# Patient Record
Sex: FEMALE | Race: WHITE | NOT HISPANIC OR LATINO | Employment: OTHER | URBAN - METROPOLITAN AREA
[De-identification: names, ages, dates, MRNs, and addresses within clinical notes are randomized per-mention and may not be internally consistent; named-entity substitution may affect disease eponyms.]

---

## 2023-09-19 ENCOUNTER — OFFICE VISIT (OUTPATIENT)
Dept: URGENT CARE | Facility: CLINIC | Age: 89
End: 2023-09-19
Payer: MEDICARE

## 2023-09-19 VITALS
BODY MASS INDEX: 24.8 KG/M2 | RESPIRATION RATE: 16 BRPM | DIASTOLIC BLOOD PRESSURE: 72 MMHG | WEIGHT: 140 LBS | OXYGEN SATURATION: 96 % | HEART RATE: 57 BPM | TEMPERATURE: 98 F | HEIGHT: 63 IN | SYSTOLIC BLOOD PRESSURE: 152 MMHG

## 2023-09-19 DIAGNOSIS — R03.0 ELEVATED BLOOD PRESSURE READING: Primary | ICD-10-CM

## 2023-09-19 DIAGNOSIS — I10 BENIGN ESSENTIAL HTN: ICD-10-CM

## 2023-09-19 PROCEDURE — 99203 OFFICE O/P NEW LOW 30 MIN: CPT | Mod: S$GLB,,, | Performed by: STUDENT IN AN ORGANIZED HEALTH CARE EDUCATION/TRAINING PROGRAM

## 2023-09-19 PROCEDURE — 99203 PR OFFICE/OUTPT VISIT, NEW, LEVL III, 30-44 MIN: ICD-10-PCS | Mod: S$GLB,,, | Performed by: STUDENT IN AN ORGANIZED HEALTH CARE EDUCATION/TRAINING PROGRAM

## 2023-09-19 RX ORDER — ATENOLOL 100 MG/1
TABLET ORAL
COMMUNITY
Start: 2023-08-09

## 2023-09-19 RX ORDER — MUPIROCIN 20 MG/G
OINTMENT TOPICAL
COMMUNITY
Start: 2023-08-07

## 2023-09-19 RX ORDER — AZELASTINE 1 MG/ML
SPRAY, METERED NASAL
COMMUNITY
Start: 2023-08-18

## 2023-09-19 NOTE — PROGRESS NOTES
"Subjective:      Patient ID: Ara Moreno is a 89 y.o. female.    Vitals:  height is 5' 2.5" (1.588 m) and weight is 63.5 kg (140 lb). Her temperature is 97.5 °F (36.4 °C). Her blood pressure is 152/72 (abnormal) and her pulse is 57 (abnormal). Her respiration is 16 and oxygen saturation is 96%.     Chief Complaint: Hypertension    Patient is here for blood pressure issues. She monitors it at home. Yesterday, her systolic numbers were higher than normal. Today, both systolic and diastolic numbers were high. She called her PCP but wasn't able to get an appointment until tomorrow. She states that she has been taking the same dosage of atenolol but the drug manufactor changed recently.   Drinks 2 cups of coffee in the morning  Misses her BP medication once a week  She has been very stressed out due to getting her house renovated  She has been scheduled to see her PCP's NP tomorrow for blood pressure f/u  BP reading from yesterday were 150/70s  Today her BP reading was the highest          Hypertension  This is a new problem. The current episode started yesterday. The problem has been gradually worsening since onset. Associated symptoms include sweats. Pertinent negatives include no blurred vision, chest pain, headaches or shortness of breath. Risk factors for coronary artery disease include family history. There is no history of angina or kidney disease.       Constitution: Negative for chills and fever.   Cardiovascular:  Negative for chest pain.   Eyes:  Negative for blurred vision.   Respiratory:  Negative for shortness of breath.    Neurological:  Negative for headaches.      Objective:     Physical Exam   HENT:   Head: Normocephalic and atraumatic.   Eyes: Conjunctivae are normal. Pupils are equal, round, and reactive to light.   Cardiovascular: Normal rate, regular rhythm and normal heart sounds.   Pulmonary/Chest: Effort normal and breath sounds normal.   Abdominal: Normal appearance.   Neurological: She is " alert.   Psychiatric: Her behavior is normal. Mood normal.       Assessment:     1. Elevated blood pressure reading    2. Benign essential HTN        Plan:       Elevated blood pressure reading    Benign essential HTN    -repeat BP was better at 152/72  -pt has an appointment with PCP tomorrow to change BP medication  -counseled patient on checking her BP only in the morning and in the evening  -patient given ER and return precautions      Medical Decision Making:   Differential Diagnosis:   Elevated BP 2/2 to anxiety vs pt may need BP medication changed  Urgent Care Management:  This patient is 89-year-old female with a past medical history of hypertension who comes in with elevated blood pressure.  Patient states that her blood pressure has been elevated and got up to 180 systolically.  She is concerned about her blood pressure because she states that it is never this high.  Patient denied any chest pain shortness of breath nausea vomiting headache vision changes.  Vital signs were within normal limits except for BP of 176/72.  Physical exam was unremarkable.  The patient's blood pressure was rechecked at the end of the visit and her BP was 152/72.  I discussed with the patient that she is checking her blood pressure too often also expressed to her that anxiety can cause her blood pressure to be elevated as well.  I explained to her that she may need to consider a blood pressure medication change as atenolol has poor blood pressure control.  She made an appointment with her PCP while I was in the room.  I gave the patient written instructions on some BP medication suggestions that she may want to consider after discussing this with her PCP in office tomorrow.  She agree that her blood pressure medication needs to be changed.  She also stated that she would stop checking her blood pressures often.  ED and return precautions were given to the patient.  She voiced understanding.   Please attend your appointment  tomorrow with your primary care doctor at 11:00 a.m..  You need to discuss with your doctor changing your blood pressure medication to something else as your blood pressure medication may not be working anymore.  You can consider changing the medication to amlodipine or a diuretic such as hydrochlorothiazide or chlorthalidone alone.  If the medication needs to remain a beta-blocker then I would consider changing the medication to carvedilol.  Please take your blood pressure in the morning and at nighttime.  Try not to measure your blood pressure more than twice a day as this may cause falsely elevated readings.  If your blood pressure is consistently above 180/120 and you have any of the following such as chest pain, shortness of breath, headaches, nausea or vomiting, vision changes then please go to the ER.  If you have any questions please give us a call at the urgent care.     Please follow up with your primary care provider within 2-5 days if your signs and symptoms have not resolved or worsen.     If your condition worsens or fails to improve we recommend that you receive another evaluation at the emergency room immediately or contact your primary medical clinic to discuss your concerns.   You must understand that you have received an Urgent Care treatment only and that you may be released before all of your medical problems are known or treated. You, the patient, will arrange for follow up care as instructed.

## 2023-09-19 NOTE — PATIENT INSTRUCTIONS
Please attend your appointment tomorrow with your primary care doctor at 11:00 a.m..  You need to discuss with your doctor changing your blood pressure medication to something else as your blood pressure medication may not be working anymore.  You can consider changing the medication to amlodipine or a diuretic such as hydrochlorothiazide or chlorthalidone alone.  If the medication needs to remain a beta-blocker then I would consider changing the medication to carvedilol.  Please take your blood pressure in the morning and at nighttime.  Try not to measure your blood pressure more than twice a day as this may cause falsely elevated readings.  If your blood pressure is consistently above 180/120 and you have any of the following such as chest pain, shortness of breath, headaches, nausea or vomiting, vision changes then please go to the ER.  If you have any questions please give us a call at the urgent care.     Please follow up with your primary care provider within 2-5 days if your signs and symptoms have not resolved or worsen.     If your condition worsens or fails to improve we recommend that you receive another evaluation at the emergency room immediately or contact your primary medical clinic to discuss your concerns.   You must understand that you have received an Urgent Care treatment only and that you may be released before all of your medical problems are known or treated. You, the patient, will arrange for follow up care as instructed.

## 2025-03-13 ENCOUNTER — HOSPITAL ENCOUNTER (OUTPATIENT)
Dept: TELEMEDICINE | Facility: HOSPITAL | Age: 89
Discharge: HOME | End: 2025-03-13
Payer: MEDICARE

## 2025-03-13 DIAGNOSIS — I63.511 STROKE DUE TO OCCLUSION OF RIGHT MIDDLE CEREBRAL ARTERY: Primary | ICD-10-CM

## 2025-03-13 NOTE — SUBJECTIVE & OBJECTIVE
HPI:  89 y.o. female with a PMHx significant for HTN, HLD presenting with left-sided weakness and numbness and dysarthria. LKN around 1220. No AP/AC medications.     /92      Images personally reviewed and interpreted:  Study: Head CT and CTA Head & Neck  Study Interpretation: No acute intracranial hemorrhage. CTA head/neck with a right M2 occlusion.      Assessment and plan:  # Stroke due to occlusion of right middle cerebral artery  - Symptoms have rapidly improved since onset with residual left facial droop and dysarthria. Mild weakness on confrontation examination. As such, plan to defer IV thrombolysis at this time given her current examination. Also not an EVT candidate currently; however, will plan to transfer to a comprehensive stroke center for close monitoring, consideration of intervention if her exam were to worsen.     Lytics recommendation: Thrombolytic therapy not recommended due to Mild Non-Disabling Symptoms  Thrombectomy recommendation: No; no significant neurologic deficit (NIHSS <6)   Placement recommendation: pending further studies    Antithrombotics for secondary stroke prevention: Load aspirin 325mg, clopidogrel 300mg x 1 now. Then start dual-antiplatelet therapy with ASA 81mg and clopidogrel 75mg daily for 21 days. Then continue ASA 81mg indefinitely.     Statins for secondary stroke prevention and hyperlipidemia, if present: Recommend initiation or continuation of a high-intensity statin for goal LDL < 70mg/dL.    Aggressive risk factor modification: HTN     Rehab efforts: The patient has been evaluated by a stroke team provider and the therapy needs have been fully considered based off the presenting complaints and exam findings. The following therapy evaluations are needed: PT evaluate and treat, OT evaluate and treat, SLP evaluate and treat    Diagnostics recommended:   - MRI brain without contrast   - TTE without bubble study, monitor on telemetry while admitted  - Labs:  hemoglobin A1c (goal <7%), lipid panel (LDL goal <70mg/dL), TSH  - Treat hyperglycemia to achieve a blood glucose level in a range of 140-180 mg/dL and to closely monitor to prevent hypoglycemia (Class IIa, Level C-LD).    VTE prophylaxis: Enoxaparin 40 mg SQ every 24 hours  Mechanical prophylaxis: Place SCDs    BP parameters: Infarct: No intervention, SBP <220    - Patient may be at risk for worsening deficits with positional changes or drops in blood pressure  (pressure-dependent state), or if there is extension of stroke or development of cerebral edema;  as such, recommend close neurological monitoring.  - Recommend permissive hypertension for the initial 24 to 48 hours of acute ischemic stroke unless the blood pressure is >220 mm Hg systolic or >120 mm Hg diastolic, or there is a concomitant specific medical condition that would benefit from blood pressure lowering (e.g., MI, aortic dissection).  - In the setting of possible hypovolemia, initiate IV fluids to maintain adequate hydration and euvolemia (although be cautious in patients at risk of fluid overload, such as those with renal or heart failure).  - The optimal time after the onset of acute ischemic stroke to restart or start long-term antihypertensive therapy has not been established and may depend on various patient and stroke characteristics (e.g., pressure dependent state), but in most patients it is reasonable to initiate long-term antihypertensive therapy after the initial 48 to 72 hours from stroke onset.    Please contact us with any further questions or concerns or if patient has any acute neurological changes (new symptoms, worsening deficits).

## 2025-03-13 NOTE — TELEMEDICINE CONSULT
[facility]  Vascular Neurology  Comprehensive Stroke Center  TeleVascular Neurology Acute Consultation Note        Consult Information  Consults    Consulting Provider: CHANNING CHRISTENSEN   Current Providers  No providers found    Patient Location: [facility] Emergency Department    Spoke hospital nurse at bedside with patient assisting consultant.  Patient information was obtained from patient.       Stroke Documentation  Acute Stroke Times   Last Known Normal Date: 03/13/25  Last Known Normal Time: 1220  Symptom Onset Date: 03/13/25  Symptom Onset Time: 1220  Stroke Team Called Date: 03/13/25  Stroke Team Called Time: 1344  Stroke Team Arrival Date: 03/13/25  Stroke Team Arrival Time: 1344  CT Interpretation Time: 1358  Thrombolytic Therapy Recommended: No    NIH Scale:  Interval: baseline  1a. Level of Consciousness: 0-->Alert, keenly responsive  1b. LOC Questions: 0-->Answers both questions correctly  1c. LOC Commands: 0-->Performs both tasks correctly  2. Best Gaze: 0-->Normal  3. Visual: 0-->No visual loss  4. Facial Palsy: 1-->Minor paralysis (flattened nasolabial fold, asymmetry on smiling)  5a. Motor Arm, Left: 0-->No drift, limb holds 90 (or 45) degrees for full 10 secs  5b. Motor Arm, Right: 0-->No drift, limb holds 90 (or 45) degrees for full 10 secs  6a. Motor Leg, Left: 0-->No drift, leg holds 30 degree position for full 5 secs  6b. Motor Leg, Right: 0-->No drift, leg holds 30 degree position for full 5 secs  7. Limb Ataxia: 0-->Absent  8. Sensory: 0-->Normal, no sensory loss  9. Best Language: 0-->No aphasia, normal  10. Dysarthria: 1-->Mild-to-moderate dysarthria, patient slurs at least some words and, at worst, can be understood with some difficulty  11. Extinction and Inattention (formerly Neglect): 0-->No abnormality  Total (NIH Stroke Scale): 2      Modified Ciales: Score: 0  Elinor Coma Scale:     ABCD2 Score:     [id number] Score:    HAS -BLED Score:    ICH Score:    Hunt & Reyes Classification:      There were no vitals taken for this visit.      In my opinion, this was a: Tier 1; VAN Stroke Assessment: Negative     Medical Decision Making  HPI:  89 y.o. female with a PMHx significant for HTN, HLD presenting with left-sided weakness and numbness and dysarthria. LKN around 1220. No AP/AC medications.     /92      Images personally reviewed and interpreted:  Study: Head CT and CTA Head & Neck  Study Interpretation: No acute intracranial hemorrhage. CTA head/neck with a right M2 occlusion.      Assessment and plan:  # Stroke due to occlusion of right middle cerebral artery  - Symptoms have rapidly improved since onset with residual left facial droop and dysarthria. Mild weakness on confrontation examination. As such, plan to defer IV thrombolysis at this time given her current examination. Also not an EVT candidate currently; however, will plan to transfer to a comprehensive stroke center for close monitoring, consideration of intervention if her exam were to worsen.     Lytics recommendation: Thrombolytic therapy not recommended due to Mild Non-Disabling Symptoms  Thrombectomy recommendation: No; no significant neurologic deficit (NIHSS <6)   Placement recommendation: pending further studies    Antithrombotics for secondary stroke prevention: Load aspirin 325mg, clopidogrel 300mg x 1 now. Then start dual-antiplatelet therapy with ASA 81mg and clopidogrel 75mg daily for 21 days. Then continue ASA 81mg indefinitely.     Statins for secondary stroke prevention and hyperlipidemia, if present: Recommend initiation or continuation of a high-intensity statin for goal LDL < 70mg/dL.    Aggressive risk factor modification: HTN     Rehab efforts: The patient has been evaluated by a stroke team provider and the therapy needs have been fully considered based off the presenting complaints and exam findings. The following  therapy evaluations are needed: PT evaluate and treat, OT evaluate and treat, SLP evaluate and treat    Diagnostics recommended:   - MRI brain without contrast   - TTE without bubble study, monitor on telemetry while admitted  - Labs: hemoglobin A1c (goal <7%), lipid panel (LDL goal <70mg/dL), TSH  - Treat hyperglycemia to achieve a blood glucose level in a range of 140-180 mg/dL and to closely monitor to prevent hypoglycemia (Class IIa, Level C-LD).    VTE prophylaxis: Enoxaparin 40 mg SQ every 24 hours  Mechanical prophylaxis: Place SCDs    BP parameters: Infarct: No intervention, SBP <220    - Patient may be at risk for worsening deficits with positional changes or drops in blood pressure  (pressure-dependent state), or if there is extension of stroke or development of cerebral edema;  as such, recommend close neurological monitoring.  - Recommend permissive hypertension for the initial 24 to 48 hours of acute ischemic stroke unless the blood pressure is >220 mm Hg systolic or >120 mm Hg diastolic, or there is a concomitant specific medical condition that would benefit from blood pressure lowering (e.g., MI, aortic dissection).  - In the setting of possible hypovolemia, initiate IV fluids to maintain adequate hydration and euvolemia (although be cautious in patients at risk of fluid overload, such as those with renal or heart failure).  - The optimal time after the onset of acute ischemic stroke to restart or start long-term antihypertensive therapy has not been established and may depend on various patient and stroke characteristics (e.g., pressure dependent state), but in most patients it is reasonable to initiate long-term antihypertensive therapy after the initial 48 to 72 hours from stroke onset.    Please contact us with any further questions or concerns or if patient has any acute neurological changes (new symptoms, worsening deficits).              ROS  Physical Exam  No past medical history on file.  No  past surgical history on file.  No family history on file.    Diagnoses  Problem Noted   Stroke Due to Occlusion of Right Middle Cerebral Artery 3/13/2025       Tiffany Ortiz MD      Emergent/Acute neurological consultation requested by spoke provider due to critical concerns for possible cerebrovascular event that could result in permanent loss of neurologic/bodily function, severe disability or death of this patient.  Immediate/timely evaluation by a highly prepared expert is paramount for optimal outcomes  High risk for neurological deterioration if not properly diagnosed  High risk for neurological deterioration if not treated promplty/as soon as possible  Complex diagnostic evaluation may be required (advanced imaging)  High risk treatment options (thrombolytics and/or thrombectomy)    Patient care was coordinated with spoke provider, including but not limted to    Discussing likely diagnosis/etiology of symptoms  Making recommendations for further diagnostic studies  Making recommendations for intravenous thrombolytics or other advanced therapies  Making recommendations for disposition (admission/transfer for higher level of care)      Neurology consultation requested by spoke provider. Audiovisual encounter with the patient performed using a secure connection.  Results and impressions from the visit are documented on this note and were communicated to the consulting provider/team via direct communication. The note has been shared for addition to the patients electronic medical record.

## 2025-03-14 ENCOUNTER — HOSPITAL ENCOUNTER (INPATIENT)
Facility: HOSPITAL | Age: 89
LOS: 4 days | Discharge: NURSING, REHAB OR LONG-TERM CARE | DRG: 065 | End: 2025-03-18
Attending: INTERNAL MEDICINE | Admitting: INTERNAL MEDICINE
Payer: MEDICARE

## 2025-03-14 DIAGNOSIS — I63.9 CVA (CEREBRAL VASCULAR ACCIDENT): ICD-10-CM

## 2025-03-14 DIAGNOSIS — I63.9 DYSARTHRIA DUE TO ACUTE CEREBELLAR CEREBROVASCULAR ACCIDENT (CVA): ICD-10-CM

## 2025-03-14 DIAGNOSIS — I63.511 STROKE DUE TO OCCLUSION OF RIGHT MIDDLE CEREBRAL ARTERY: Primary | ICD-10-CM

## 2025-03-14 DIAGNOSIS — R47.1 DYSARTHRIA DUE TO ACUTE CEREBELLAR CEREBROVASCULAR ACCIDENT (CVA): ICD-10-CM

## 2025-03-14 LAB
ALBUMIN SERPL-MCNC: 3.8 G/DL (ref 3.4–4.8)
ALBUMIN/GLOB SERPL: 1.1 RATIO (ref 1.1–2)
ALP SERPL-CCNC: 112 UNIT/L (ref 40–150)
ALT SERPL-CCNC: 9 UNIT/L (ref 0–55)
ANION GAP SERPL CALC-SCNC: 11 MEQ/L
APICAL FOUR CHAMBER EJECTION FRACTION: 69 %
APICAL TWO CHAMBER EJECTION FRACTION: 66 %
APTT PPP: 26.2 SECONDS (ref 23.2–33.7)
AST SERPL-CCNC: 28 UNIT/L (ref 5–34)
AV INDEX (PROSTH): 1.04
AV MEAN GRADIENT: 3 MMHG
AV PEAK GRADIENT: 5 MMHG
AV VALVE AREA BY VELOCITY RATIO: 3.1 CM²
AV VALVE AREA: 3.3 CM²
AV VELOCITY RATIO: 1
BASOPHILS # BLD AUTO: 0.04 X10(3)/MCL
BASOPHILS NFR BLD AUTO: 0.4 %
BILIRUB SERPL-MCNC: 1.3 MG/DL
BSA FOR ECHO PROCEDURE: 1.63 M2
BUN SERPL-MCNC: 13.3 MG/DL (ref 9.8–20.1)
CALCIUM SERPL-MCNC: 9.2 MG/DL (ref 8.4–10.2)
CHLORIDE SERPL-SCNC: 105 MMOL/L (ref 98–111)
CHOLEST SERPL-MCNC: 187 MG/DL
CHOLEST/HDLC SERPL: 5 {RATIO} (ref 0–5)
CO2 SERPL-SCNC: 23 MMOL/L (ref 23–31)
CREAT SERPL-MCNC: 0.75 MG/DL (ref 0.55–1.02)
CREAT/UREA NIT SERPL: 18
CV ECHO LV RWT: 0.44 CM
DOP CALC AO PEAK VEL: 1.1 M/S
DOP CALC AO VTI: 27.3 CM
DOP CALC LVOT AREA: 3.1 CM2
DOP CALC LVOT DIAMETER: 2 CM
DOP CALC LVOT PEAK VEL: 1.1 M/S
DOP CALC LVOT STROKE VOLUME: 89.2 CM3
DOP CALC MV VTI: 34.9 CM
DOP CALCLVOT PEAK VEL VTI: 28.4 CM
E WAVE DECELERATION TIME: 254 MSEC
E/A RATIO: 0.74
E/E' RATIO: 9 M/S
ECHO LV POSTERIOR WALL: 1 CM (ref 0.6–1.1)
EOSINOPHIL # BLD AUTO: 0.09 X10(3)/MCL (ref 0–0.9)
EOSINOPHIL NFR BLD AUTO: 0.8 %
ERYTHROCYTE [DISTWIDTH] IN BLOOD BY AUTOMATED COUNT: 13.5 % (ref 11.5–17)
EST. AVERAGE GLUCOSE BLD GHB EST-MCNC: 111.2 MG/DL
FRACTIONAL SHORTENING: 44.4 % (ref 28–44)
GFR SERPLBLD CREATININE-BSD FMLA CKD-EPI: >60 ML/MIN/1.73/M2
GLOBULIN SER-MCNC: 3.4 GM/DL (ref 2.4–3.5)
GLUCOSE SERPL-MCNC: 97 MG/DL (ref 75–121)
HBA1C MFR BLD: 5.5 %
HCT VFR BLD AUTO: 42.9 % (ref 37–47)
HDLC SERPL-MCNC: 40 MG/DL (ref 35–60)
HGB BLD-MCNC: 14.9 G/DL (ref 12–16)
HR MV ECHO: 61 BPM
IMM GRANULOCYTES # BLD AUTO: 0.04 X10(3)/MCL (ref 0–0.04)
IMM GRANULOCYTES NFR BLD AUTO: 0.4 %
INR PPP: 1.1
INTERVENTRICULAR SEPTUM: 1.4 CM (ref 0.6–1.1)
LDLC SERPL CALC-MCNC: 116 MG/DL (ref 50–140)
LEFT ATRIUM AREA SYSTOLIC (APICAL 2 CHAMBER): 15.6 CM2
LEFT ATRIUM AREA SYSTOLIC (APICAL 4 CHAMBER): 15.6 CM2
LEFT ATRIUM SIZE: 3.8 CM
LEFT ATRIUM VOLUME INDEX MOD: 24 ML/M2
LEFT ATRIUM VOLUME MOD: 39 ML
LEFT INTERNAL DIMENSION IN SYSTOLE: 2.5 CM (ref 2.1–4)
LEFT VENTRICLE DIASTOLIC VOLUME INDEX: 57.14 ML/M2
LEFT VENTRICLE DIASTOLIC VOLUME: 92 ML
LEFT VENTRICLE END DIASTOLIC VOLUME APICAL 2 CHAMBER: 55.4 ML
LEFT VENTRICLE END DIASTOLIC VOLUME APICAL 4 CHAMBER: 63.3 ML
LEFT VENTRICLE END SYSTOLIC VOLUME APICAL 2 CHAMBER: 40.3 ML
LEFT VENTRICLE END SYSTOLIC VOLUME APICAL 4 CHAMBER: 37.9 ML
LEFT VENTRICLE MASS INDEX: 123 G/M2
LEFT VENTRICLE SYSTOLIC VOLUME INDEX: 13.7 ML/M2
LEFT VENTRICLE SYSTOLIC VOLUME: 22 ML
LEFT VENTRICULAR INTERNAL DIMENSION IN DIASTOLE: 4.5 CM (ref 3.5–6)
LEFT VENTRICULAR MASS: 198.1 G
LV LATERAL E/E' RATIO: 8.6 M/S
LV SEPTAL E/E' RATIO: 9.9 M/S
LVED V (TEICH): 91.5 ML
LVES V (TEICH): 21.7 ML
LVOT MG: 3 MMHG
LVOT MV: 0.72 CM/S
LYMPHOCYTES # BLD AUTO: 4.06 X10(3)/MCL (ref 0.6–4.6)
LYMPHOCYTES NFR BLD AUTO: 38.3 %
MAGNESIUM SERPL-MCNC: 1.9 MG/DL (ref 1.6–2.6)
MCH RBC QN AUTO: 28.2 PG (ref 27–31)
MCHC RBC AUTO-ENTMCNC: 34.7 G/DL (ref 33–36)
MCV RBC AUTO: 81.3 FL (ref 80–94)
MONOCYTES # BLD AUTO: 0.64 X10(3)/MCL (ref 0.1–1.3)
MONOCYTES NFR BLD AUTO: 6 %
MV MEAN GRADIENT: 2 MMHG
MV PEAK A VEL: 0.93 M/S
MV PEAK E VEL: 0.69 M/S
MV PEAK GRADIENT: 5 MMHG
MV STENOSIS PRESSURE HALF TIME: 71 MS
MV VALVE AREA BY CONTINUITY EQUATION: 2.56 CM2
MV VALVE AREA P 1/2 METHOD: 3.1 CM2
NEUTROPHILS # BLD AUTO: 5.74 X10(3)/MCL (ref 2.1–9.2)
NEUTROPHILS NFR BLD AUTO: 54.1 %
NRBC BLD AUTO-RTO: 0 %
OHS LV EJECTION FRACTION SIMPSONS BIPLANE MOD: 67 %
OHS QRS DURATION: 84 MS
OHS QTC CALCULATION: 422 MS
PISA TR MAX VEL: 2.4 M/S
PLATELET # BLD AUTO: 130 X10(3)/MCL (ref 130–400)
PMV BLD AUTO: 9.5 FL (ref 7.4–10.4)
POTASSIUM SERPL-SCNC: 3.7 MMOL/L (ref 3.5–5.1)
PROT SERPL-MCNC: 7.2 GM/DL (ref 5.8–7.6)
PROTHROMBIN TIME: 13.8 SECONDS (ref 12.5–14.5)
RA PRESSURE ESTIMATED: 3 MMHG
RBC # BLD AUTO: 5.28 X10(6)/MCL (ref 4.2–5.4)
RV TB RVSP: 5 MMHG
SINUS: 3.2 CM
SODIUM SERPL-SCNC: 139 MMOL/L (ref 136–145)
TDI LATERAL: 0.08 M/S
TDI SEPTAL: 0.07 M/S
TDI: 0.08 M/S
TR MAX PG: 17 MMHG
TRICUSPID ANNULAR PLANE SYSTOLIC EXCURSION: 2.11 CM
TRIGL SERPL-MCNC: 153 MG/DL (ref 37–140)
TROPONIN I SERPL-MCNC: 0.01 NG/ML (ref 0–0.04)
TSH SERPL-ACNC: 1.53 UIU/ML (ref 0.35–4.94)
TV REST PULMONARY ARTERY PRESSURE: 26 MMHG
VLDLC SERPL CALC-MCNC: 31 MG/DL
WBC # BLD AUTO: 10.61 X10(3)/MCL (ref 4.5–11.5)
Z-SCORE OF LEFT VENTRICULAR DIMENSION IN END DIASTOLE: -0.15
Z-SCORE OF LEFT VENTRICULAR DIMENSION IN END SYSTOLE: -0.99

## 2025-03-14 PROCEDURE — 25500020 PHARM REV CODE 255: Performed by: INTERNAL MEDICINE

## 2025-03-14 PROCEDURE — 84484 ASSAY OF TROPONIN QUANT: CPT

## 2025-03-14 PROCEDURE — 97166 OT EVAL MOD COMPLEX 45 MIN: CPT

## 2025-03-14 PROCEDURE — 36415 COLL VENOUS BLD VENIPUNCTURE: CPT

## 2025-03-14 PROCEDURE — 25000003 PHARM REV CODE 250: Performed by: NURSE PRACTITIONER

## 2025-03-14 PROCEDURE — 83036 HEMOGLOBIN GLYCOSYLATED A1C: CPT

## 2025-03-14 PROCEDURE — 80053 COMPREHEN METABOLIC PANEL: CPT

## 2025-03-14 PROCEDURE — 25000003 PHARM REV CODE 250: Performed by: INTERNAL MEDICINE

## 2025-03-14 PROCEDURE — 84443 ASSAY THYROID STIM HORMONE: CPT

## 2025-03-14 PROCEDURE — 80061 LIPID PANEL: CPT

## 2025-03-14 PROCEDURE — 83735 ASSAY OF MAGNESIUM: CPT

## 2025-03-14 PROCEDURE — 85025 COMPLETE CBC W/AUTO DIFF WBC: CPT

## 2025-03-14 PROCEDURE — 93005 ELECTROCARDIOGRAM TRACING: CPT

## 2025-03-14 PROCEDURE — 25000003 PHARM REV CODE 250

## 2025-03-14 PROCEDURE — 97162 PT EVAL MOD COMPLEX 30 MIN: CPT

## 2025-03-14 PROCEDURE — 85730 THROMBOPLASTIN TIME PARTIAL: CPT

## 2025-03-14 PROCEDURE — 85610 PROTHROMBIN TIME: CPT

## 2025-03-14 PROCEDURE — 93010 ELECTROCARDIOGRAM REPORT: CPT | Mod: ,,, | Performed by: INTERNAL MEDICINE

## 2025-03-14 PROCEDURE — 21400001 HC TELEMETRY ROOM

## 2025-03-14 RX ORDER — ASPIRIN 81 MG/1
81 TABLET ORAL DAILY
Status: DISCONTINUED | OUTPATIENT
Start: 2025-03-14 | End: 2025-03-18 | Stop reason: HOSPADM

## 2025-03-14 RX ORDER — ATENOLOL 100 MG/1
100 TABLET ORAL DAILY
Status: ON HOLD | COMMUNITY
End: 2025-03-18 | Stop reason: HOSPADM

## 2025-03-14 RX ORDER — LABETALOL HYDROCHLORIDE 5 MG/ML
10 INJECTION, SOLUTION INTRAVENOUS EVERY 4 HOURS PRN
Status: DISCONTINUED | OUTPATIENT
Start: 2025-03-14 | End: 2025-03-17

## 2025-03-14 RX ORDER — SODIUM CHLORIDE 9 MG/ML
INJECTION, SOLUTION INTRAVENOUS CONTINUOUS
Status: DISCONTINUED | OUTPATIENT
Start: 2025-03-14 | End: 2025-03-17

## 2025-03-14 RX ORDER — ACETAMINOPHEN 325 MG/1
650 TABLET ORAL EVERY 6 HOURS PRN
Status: DISCONTINUED | OUTPATIENT
Start: 2025-03-14 | End: 2025-03-18 | Stop reason: HOSPADM

## 2025-03-14 RX ORDER — CALCIUM CARBONATE 200(500)MG
500 TABLET,CHEWABLE ORAL 3 TIMES DAILY PRN
Status: DISCONTINUED | OUTPATIENT
Start: 2025-03-14 | End: 2025-03-18 | Stop reason: HOSPADM

## 2025-03-14 RX ORDER — BISACODYL 10 MG/1
10 SUPPOSITORY RECTAL DAILY PRN
Status: DISCONTINUED | OUTPATIENT
Start: 2025-03-14 | End: 2025-03-18 | Stop reason: HOSPADM

## 2025-03-14 RX ORDER — ATORVASTATIN CALCIUM 40 MG/1
40 TABLET, FILM COATED ORAL DAILY
Status: DISCONTINUED | OUTPATIENT
Start: 2025-03-14 | End: 2025-03-18 | Stop reason: HOSPADM

## 2025-03-14 RX ORDER — SODIUM CHLORIDE 0.9 % (FLUSH) 0.9 %
10 SYRINGE (ML) INJECTION
Status: DISCONTINUED | OUTPATIENT
Start: 2025-03-14 | End: 2025-03-18 | Stop reason: HOSPADM

## 2025-03-14 RX ORDER — MUPIROCIN 20 MG/G
OINTMENT TOPICAL 2 TIMES DAILY
Status: DISCONTINUED | OUTPATIENT
Start: 2025-03-14 | End: 2025-03-18 | Stop reason: HOSPADM

## 2025-03-14 RX ORDER — HYDRALAZINE HYDROCHLORIDE 20 MG/ML
10 INJECTION INTRAMUSCULAR; INTRAVENOUS EVERY 6 HOURS PRN
Status: DISCONTINUED | OUTPATIENT
Start: 2025-03-14 | End: 2025-03-17

## 2025-03-14 RX ADMIN — ATORVASTATIN CALCIUM 40 MG: 40 TABLET, FILM COATED ORAL at 10:03

## 2025-03-14 RX ADMIN — ACETAMINOPHEN 650 MG: 325 TABLET, FILM COATED ORAL at 10:03

## 2025-03-14 RX ADMIN — SODIUM CHLORIDE: 9 INJECTION, SOLUTION INTRAVENOUS at 12:03

## 2025-03-14 RX ADMIN — SODIUM CHLORIDE: 9 INJECTION, SOLUTION INTRAVENOUS at 11:03

## 2025-03-14 RX ADMIN — ASPIRIN 81 MG: 81 TABLET, COATED ORAL at 10:03

## 2025-03-14 RX ADMIN — MUPIROCIN: 20 OINTMENT TOPICAL at 10:03

## 2025-03-14 RX ADMIN — IOHEXOL 100 ML: 350 INJECTION, SOLUTION INTRAVENOUS at 12:03

## 2025-03-14 NOTE — PLAN OF CARE
Problem: Adult Inpatient Plan of Care  Goal: Plan of Care Review  Flowsheets (Taken 3/14/2025 1809)  Plan of Care Reviewed With:   patient   family  Goal: Absence of Hospital-Acquired Illness or Injury  Intervention: Identify and Manage Fall Risk  Flowsheets (Taken 3/14/2025 1809)  Safety Promotion/Fall Prevention:   Fall Risk reviewed with patient/family   family to remain at bedside   medications reviewed   lighting adjusted   instructed to call staff for mobility   side rails raised x 2  Intervention: Prevent Skin Injury  Flowsheets (Taken 3/14/2025 1809)  Body Position:   turned   weight shifting  Skin Protection: incontinence pads utilized  Device Skin Pressure Protection:   absorbent pad utilized/changed   positioning supports utilized   tubing/devices free from skin contact  Intervention: Prevent and Manage VTE (Venous Thromboembolism) Risk  Flowsheets (Taken 3/14/2025 1809)  VTE Prevention/Management:   remove, assess skin, and reapply sequential compression device   ROM (active) performed   ROM (passive) performed   dorsiflexion/plantar flexion performed   intravenous hydration  Intervention: Prevent Infection  Flowsheets (Taken 3/14/2025 1809)  Infection Prevention: hand hygiene promoted  Goal: Optimal Comfort and Wellbeing  Intervention: Monitor Pain and Promote Comfort  Flowsheets (Taken 3/14/2025 1809)  Pain Management Interventions:   care clustered   medication offered   position adjusted

## 2025-03-14 NOTE — ASSESSMENT & PLAN NOTE
- presented with left sided weakness, slurred speech, left facil droop  - Stroke RF: HTN, HLD  - Intervention: none, non-disabling symptoms  - Etiology: TBD    Stroke workup:  -CTh:  negative  -CTA h/n: right M2 occlusion  -LDL: 116  -A1c: 5.5  -TSH: 1.535  -home medications include:  NIH Stroke Scale      1a  Level of consciousness: 0=alert; keenly responsive   1b. LOC questions:  0=Answers both tasks correctly   1c. LOC commands: 0=Answers both tasks correctly   2.  Best Gaze: 0=normal   3.  Visual: 0=No visual loss   4. Facial Palsy: 1=Minor paralysis (flattened nasolabial fold, asymmetric on smiling)   5a.  Motor left arm: 1=Drift, limb holds 90 (or 45) degrees but drifts down before full 10 seconds: does not hit bed   5b.  Motor right arm: 0=No drift, limb holds 90 (or 45) degrees for full 10 seconds   6a. motor left le=No drift, limb holds 90 (or 45) degrees for full 10 seconds   6b  Motor right le=No drift, limb holds 90 (or 45) degrees for full 10 seconds   7. Limb Ataxia: 0=Absent   8.  Sensory: 1=Mild to moderate sensory loss; patient feels pinprick is less sharp or is dull on the affected side; there is a loss of superficial pain with pinprick but patient is aware She is being touched   9. Best Language:  0=No aphasia, normal   10. Dysarthria: 1=Mild to moderate, patient slurs at least some words and at worst, can be understood with some difficulty   11. Extinction and Inattention: 0=No abnormality   12. Distal motor function: 0=Normal    Total:   4         Plan:  -continue stroke workup  -continue Aspirin 81mg daily  -continue Atorvastatin 40mg daily  -PT/OT/ST to evaluate  -SCD for DVT prophylaxis   - Allow permissive HTN ... prn hydralazine and labetalol for SBP > 220 or DBP > 120     - after 24 hours from symptom onset, ok to normalize blood pressure  - Neuro checks q4hr ... stat CTh if any neuro change   - Continuous telemetry monitoring  - NPO until passes Shaikh or cleared by SLP  - No  bedrest, ok for patient to ambulate, RN to observe first ambulation for safety

## 2025-03-14 NOTE — PT/OT/SLP EVAL
Physical Therapy Evaluation    Patient Name:  Amy Henderson   MRN:  [id number]    Recommendations:     Discharge therapy intensity: High Intensity Therapy   Discharge Equipment Recommendations: to be determined by next level of care   Barriers to discharge: Impaired mobility    Assessment:     Amy Henderson is a 89 y.o. female admitted with a medical diagnosis of CVA with L weakness and dysarthria found with M2 occlusion.  She presents with the following impairments/functional limitations: weakness, gait instability, impaired endurance, impaired balance, impaired functional mobility. The pt tolerated session well. She is motivated for therapy session. She is able to mobilize to EOB with min A, then stand with Rw and min A. Pt attempted to take steps with RW,  but reported feeling 'very dizzy'. Pt presented with impaired coordination to LLE.    Rehab Prognosis: Good; patient would benefit from acute skilled PT services to address these deficits and reach maximum level of function.    Recent Surgery: * No surgery found *      Plan:     During this hospitalization, patient would benefit from acute PT services 6 x/week to address the identified rehab impairments via gait training, therapeutic activities, therapeutic exercises and progress toward the following goals:    Plan of Care Expires:  04/14/25    Subjective     Chief Complaint: none  Patient/Family Comments/goals: return to PLOF  Pain/Comfort:  Pain Rating 1: 0/10    Patients cultural, spiritual, L[employer] conflicts given the current situation:      Living Environment:  Home alone, SL home, 1 step to enter.   Prior to admission, patients level of function was independent.  Equipment used at home: none.  DME owned (not currently used): rolling walker.  Upon discharge, patient will have assistance from family if needed.    Objective:     Communicated with NSG prior to session.  Patient found supine with telemetry  upon PT entry to room.    General Precautions:  Standard, fall  Orthopedic Precautions:N/A   Braces: N/A  Respiratory Status: Room air  Blood Pressure: NA      Exams:  RLE ROM: WFL  RLE Strength: 4/5  LLE ROM: WFL  LLE Strength: 4-/5  Skin integrity: Visible skin intact      Functional Mobility:  Bed Mobility:     Supine to Sit: minimum assistance  Sit to Supine: minimum assistance  Transfers:     Sit to Stand:  minimum assistance with rolling walker  Gait: pt attempted to take 2 steps, but  reported feeling 'very dizzy' so pt returned to bed. Will progress as able.       Patient provided with verbal education education regarding PT role/goals/POC, safety awareness, and discharge/DME recommendations.  Understanding was verbalized.     Patient left supine with all lines intact, call button in reach, and NSG notified.    GOALS:   Multidisciplinary Problems       Physical Therapy Goals          Problem: Physical Therapy    Goal Priority Disciplines Outcome Interventions   Physical Therapy Goal     PT, PT/OT Progressing    Description: Goals to be met by: 25     Patient will increase functional independence with mobility by performin. Supine to sit with Modified [place]  2. Sit to supine with Modified [place]  3. Sit to stand transfer with Modified [place]  4. Bed to chair transfer with Modified [place] using Rolling Walker  5. Gait  x 200 feet with Modified [place] using Rolling Walker.                          History:     No past medical history on file.    No past surgical history on file.    Time Tracking:     PT Received On: 25  PT Start Time: 1100     PT Stop Time: 1119  PT Total Time (min): 19 min     Billable Minutes: Evaluation 2025

## 2025-03-14 NOTE — H&P
Hospital Medicine  History & Physical Examination       Patient: Amy Henderson  MRN: [id number]  STATUS: IP- Inpatient   DOS: 3/14/2025   PCP: No primary care provider on file.      CC: left sided weakness       HISTORY OF PRESENT ILLNESS       89 y.o. female with a limited history of HTN, HLD,  was transferred to [facility] with stroke-like symptoms.  She notes she woke up on 3/13, ate breakfast without trouble. However a little after 9:30 am, she was standing at the kitchen sink doing dishes when she was having trouble gripping with her left hand. She was able to walk to the bathroom, but on her way back, she fell to her knees and was unable to get.  She was able to call her neighbors for help however.    Evaluation at outlying ED, noted a facial droop and dysarthria.  CTA apparently noted a right M2 occlusion; but no intervention or TNK recommended s/t low NIH and rapid improvement.  She was here transferred to [facility]  early morning 3/14 for further evaluation.  She was loaded with aspirin and plavix.  Neurology was consulted and patient admitted to  services.      REVIEW OF SYSTEMS     Except as documented, all other systems reviewed and negative       PAST MEDICAL HISTORY     Hypertension   Hyperlipidemia       PAST SURGICAL HISTORY     Hysterectomy  Partial thyroidectomy       FAMILY HISTORY     Reviewed, negative in relation to patient's current condition.      SOCIAL HISTORY     Denies alcohol, tobacco or drug abuse  She is independent at baseline for all ADL, iADLs, including driving; and ambulated without any assistive devices.  Screening for Social Drivers of health:  []Housing/Food Insecurity []Transportation Needs []Utility Difficulties []Interpersonal safety [x]Noncontributory        ALLERGIES     DEMEROL  KEFLEX      HOME MEDICATIONS      atenoloL (TENORMIN) 100 mg, Daily       PHYSICAL EXAM     VITALS: T 98.5 °F (36.9 °C)   BP (!) 175/80   P 67   RR 16   O2 (!) 94 %    GENERAL: Awake and in NAD  HEENT:  NC/AT, EOMI, PERRL.  NECK: Supple,  No JVD  LUNGS: CTA B/L  CVS: RRR, S1S2 positive  GI/: Soft, NT/ND, bowel sounds positive.  EXTREMITIES: Peripheral pulses 2+, no peripheral edema  DERM: Warm, dry.  No rashes or lesions noted.  NEURO: AAOx3, speech slurred, borderline comprehensible; strength:  RUE/RLE 5/5, LUE 3-45/5, LLE 4/5, left facial droop, tongue protrudes to left  PSYCH: Cooperative, appropriate mood and affect       DIAGNOSTICS     Recent Labs     03/14/25  0553   WBC 10.61   RBC 5.28   HGB 14.9   HCT 42.9   MCV 81.3   MCH 28.2   MCHC 34.7   RDW 13.5        Recent Labs     03/14/25  0553   INR 1.1   APTT 26.2     Recent Labs     03/14/25  0553   HGBA1C 5.5   CHOL 187   TRIG 153*   .00   VLDL 31   HDL 40      Recent Labs     03/14/25  0553      K 3.7   CO2 23   BUN 13.3   CREATININE 0.75   GLUCOSE 97   CALCIUM 9.2   MG 1.90   ALBUMIN 3.8   GLOBULIN 3.4   ALKPHOS 112   ALT 9   AST 28   BILITOT 1.3   TSH 1.535     Recent Labs     03/14/25  0553   TROPONINI 0.014              ASSESSMENT     Acute ischemic CVA, s/t R M2 MCA occlusion  Essential HTN  Hyperlipidemia    PLAN     Continue ASA  Stroke workup with MRI, Carotids, Echo  LDL noted, continue atorvastatin 40  Awaiting Neurology evaluation  Allow permissive HTN  SLP evaluation, though can have diet if passes bedside swallow  PT/OT evaluations pending as well      Prophylaxis: B/L SCDs  Code Status: DNR      Clement Mckay MD  Hospital Medicine      Critical Care Time:  55 minutes spent in direct hands on care, review of labs, imaging and medical record, and discussion of diagnosis, treatment, prognosis with patient/family.  Patient remains at high-risk for clinical decompensation  Critical Care diagnosis:  Acute CVA    If the patient has been admitted under observation status, it is at my discretion and under our care with hospital medicine services. [TWA]

## 2025-03-14 NOTE — PLAN OF CARE
Problem: Adult Inpatient Plan of Care  Goal: Plan of Care Review  Outcome: Progressing  Goal: Patient-Specific Goal (Individualized)  Outcome: Progressing  Goal: Absence of Hospital-Acquired Illness or Injury  Outcome: Progressing  Goal: Optimal Comfort and Wellbeing  Outcome: Progressing  Goal: Readiness for Transition of Care  Outcome: Progressing     Problem: Fall Injury Risk  Goal: Absence of Fall and Fall-Related Injury  Outcome: Progressing     Problem: Infection  Goal: Absence of Infection Signs and Symptoms  Outcome: Progressing     Problem: Stroke, Ischemic (Includes Transient Ischemic Attack)  Goal: Optimal Coping  Outcome: Progressing  Goal: Effective Bowel Elimination  Outcome: Progressing  Goal: Optimal Cerebral Tissue Perfusion  Outcome: Progressing  Goal: Optimal Cognitive Function  Outcome: Progressing  Goal: Improved Communication Skills  Outcome: Progressing  Goal: Optimal Functional Ability  Outcome: Progressing  Goal: Optimal Nutrition Intake  Outcome: Progressing  Goal: Effective Oxygenation and Ventilation  Outcome: Progressing  Goal: Improved Sensorimotor Function  Outcome: Progressing  Goal: Safe and Effective Swallow  Outcome: Progressing  Goal: Effective Urinary Elimination  Outcome: Progressing

## 2025-03-14 NOTE — PT/OT/SLP EVAL
"Occupational Therapy  Evaluation    Name: Amy Henderson  MRN: [id number]  Admitting Diagnosis: CVA  Recent Surgery: * No surgery found *      Recommendations:     Discharge therapy intensity: High Intensity Therapy   Discharge Equipment Recommendations:   (tbd)  Barriers to discharge:  None    Assessment:     Amy Henderson is a 89 y.o. female with a medical diagnosis of CVA presenting with L weakness and dysarthria and found to have M2 occlusion.   She presents with the following performance deficits affecting function: weakness, impaired endurance, impaired self care skills, impaired functional mobility, impaired sensation, decreased upper extremity function, decreased lower extremity function, decreased coordination, impaired balance, impaired coordination, impaired fine motor. At eval, pt with good effort but with general fatigue, and noted with LUE weakness, decreased coordination, and decreased sensation, able to stand with min assist and take a few steps with min assist but with c/o dizziness limiting activity. Would greatly benefit from high intensity therapy at discharge to maximize functional independence as pt lives alone and was very active and independent PTA, has excellent family support.     Rehab Prognosis: good; patient would benefit from acute skilled OT services to address these deficits and reach maximum level of function.       Plan:     Patient to be seen 6 x/week to address the above listed problems via self-care/home management, therapeutic activities, therapeutic exercises, neuromuscular re-education  Plan of Care Expires: 04/11/25  Plan of Care Reviewed with: patient, daughter    Subjective     Chief Complaint: feels tired  Patient/Family Comments/goals: "this hand feels funny"    Occupational Profile:  Living Environment: lives alone in SS house with 1 step to enter, walk in shower  Previous level of function: independent, drives, attends [employer]  Roles and Routines: mom, [employer] " member  Equipment Used at Home: none  Assistance upon Discharge: yes    Pain/Comfort:  Pain Rating 1: 0/10    Patients cultural, spiritual, Jainism conflicts given the current situation:      Objective:     OT communicated with nsg and PT prior to session.      Patient was found supine with telemetry upon OT entry to room.    General Precautions: Standard, fall  Orthopedic Precautions:    Braces:      Vital Signs: 159/72    Bed Mobility:    Sup to sit with min assist with min cues    Functional Mobility/Transfers:  Sit to stand with RW with min assist, able to take a few side steps and forward/back steps with min assist and RW, fatigue limiting   Functional Mobility:     Activities of Daily Living:  Able to don socks EOB with min assist    AMPAC 6 Click ADL:  AMPAC Total Score:      Functional Cognition:  Follows commands appropriately    Visual Perceptual Skills:  Appears grossly intact, will con't to assess    Upper Extremity Function:  Right Upper Extremity:   Wfl, 5/5    Left Upper Extremity:  4-/5 , elbow 4-5, shoulder 4-/5  Decreased F>F and F>N coordination     Balance:   Sitting SBA  Standing with RW with CGa    Patient Education:  Patient and daughter/s were provided with verbal education and demonstrations education regarding fall prevention, safety awareness, Discharge/DME recommendations, home exercise program , and LUE use with all ADL's exercises as much as able .  Understanding was verbalized.     Patient left supine with all lines intact, call button in reach, and dtr present.    GOALS:   Multidisciplinary Problems       Occupational Therapy Goals          Problem: Occupational Therapy    Goal Priority Disciplines Outcome Interventions   Occupational Therapy Goal     OT, PT/OT Progressing    Description: Goals to be met by: 4/11/25     Patient will increase functional independence with ADLs by performing:    UE Dressing with Modified [place].  LE Dressing with Modified  [place].  Grooming while standing with Modified [place].  Toileting from toilet with Modified [place] for hygiene and clothing management.   Toilet transfer to toilet with Modified [place]. Progress from bedside commode  Pt will be able to perform GM/FM tasks to grasp/release, open/close, and manipulate ADL items with LuE without  over/undershooting and without significantly increased time or effort during tasks.                           History:     No past medical history on file.    No past surgical history on file.    Time Tracking:     OT Date of Treatment: 03/14/25  OT Start Time: 1100  OT Stop Time: 1118  OT Total Time (min): 18 min    Billable Minutes:Evaluation mod complexity    3/14/2025

## 2025-03-14 NOTE — HPI
"89 year old female with a past medical history of HTN and HLD presented to [facility] on 3/14 as a transfer for stroke.  She reported she woke up on 3/13, had a normal day, she ate breakfast without trouble.  She reports a little after 9:30 am, she was standing at the kitchen sink doing dishes when she was having trouble gripping the coffee cup to wash it with her left hand.  She was able to walk to the bathroom, her way back from the bathroom, she fell on her knees "but did not fall hard".  She called her neighbors, she was able to unlock the door for them but them went to the chair to sit down because she was feeling dizzy.  Upon arrival to [facility] ED, she initially had facial droop and dysarthria, she was not given TNK 2/2 non-disabling symptoms and rapid improvement.  She was found to have a right M2 occlusion but no intervention 2/2 low NIH and rapid improvement.  She was transferred to [facility] for further evaluation.  She was loaded with aspirin and plavix.  Neurology was consulted for stroke workup.    Today, she reports symptoms have stayed about the same, daughter at bedside reports she is independent and still driving, requires no assistance for ADLs at her baseline.   "

## 2025-03-14 NOTE — PLAN OF CARE
Problem: Occupational Therapy  Goal: Occupational Therapy Goal  Description: Goals to be met by: 4/11/25     Patient will increase functional independence with ADLs by performing:    UE Dressing with Modified [place].  LE Dressing with Modified [place].  Grooming while standing with Modified [place].  Toileting from toilet with Modified [place] for hygiene and clothing management.   Toilet transfer to toilet with Modified [place]. Progress from bedside commode  Pt will be able to perform GM/FM tasks to grasp/release, open/close, and manipulate ADL items with LuE without  over/undershooting and without significantly increased time or effort during tasks.      Outcome: Progressing

## 2025-03-14 NOTE — PLAN OF CARE
Problem: Physical Therapy  Goal: Physical Therapy Goal  Description: Goals to be met by: 25     Patient will increase functional independence with mobility by performin. Supine to sit with Modified [place]  2. Sit to supine with Modified [place]  3. Sit to stand transfer with Modified [place]  4. Bed to chair transfer with Modified [place] using Rolling Walker  5. Gait  x 200 feet with Modified [place] using Rolling Walker.     Outcome: Progressing

## 2025-03-14 NOTE — SUBJECTIVE & OBJECTIVE
No past medical history on file.    No past surgical history on file.    Review of patient's allergies indicates:   Allergen Reactions    Demerol [meperidine]     Keflex [cephalexin]        Current Neurological Medications:     No current facility-administered medications on file prior to encounter.     Current Outpatient Medications on File Prior to Encounter   Medication Sig    atenoloL (TENORMIN) 100 MG tablet Take 100 mg by mouth once daily.     Family History    None       Tobacco Use    Smoking status: Not on file    Smokeless tobacco: Not on file   Substance and Sexual Activity    Alcohol use: Not on file    Drug use: Not on file    Sexual activity: Not on file     Review of Systems   Neurological:  Positive for facial asymmetry, speech difficulty, weakness, numbness and headaches. Negative for dizziness, tremors, seizures, syncope and light-headedness.   All other systems reviewed and are negative.    Objective:     Vital Signs (Most Recent):  Temp: 98.5 °F (36.9 °C) (03/14/25 0752)  Pulse: 67 (03/14/25 0752)  Resp: 16 (03/14/25 0752)  BP: (!) 175/80 (03/14/25 0752)  SpO2: (!) 94 % (03/14/25 0752) Vital Signs (24h Range):  Temp:  [97.7 °F (36.5 °C)-98.5 °F (36.9 °C)] 98.5 °F (36.9 °C)  Pulse:  [61-67] 67  Resp:  [16-18] 16  SpO2:  [94 %-95 %] 94 %  BP: (160-185)/(69-81) 175/80     Weight: 60.8 kg (134 lb 0.6 oz)  Body mass index is 24.52 kg/m².     Physical Exam  Vitals and nursing note reviewed.   Constitutional:       General: She is not in acute distress.     Appearance: Normal appearance. She is not toxic-appearing.   HENT:      Head: Normocephalic.      Right Ear: Hearing normal.      Left Ear: Hearing normal.   Eyes:      General: No visual field deficit.     Extraocular Movements:      Right eye: Normal extraocular motion and no nystagmus.      Left eye: Normal extraocular motion and no nystagmus.      Pupils: Pupils are equal, round, and reactive to light.   Cardiovascular:      Rate and Rhythm:  Normal rate.   Pulmonary:      Effort: Pulmonary effort is normal.   Musculoskeletal:         General: No swelling. Normal range of motion.      Cervical back: Normal range of motion.      Right lower leg: No edema.      Left lower leg: No edema.   Skin:     General: Skin is warm and dry.      Capillary Refill: Capillary refill takes less than 2 seconds.   Neurological:      General: No focal deficit present.      Mental Status: She is alert and oriented to person, place, and time.      Cranial Nerves: Dysarthria and facial asymmetry (left facial droop) present.      Sensory: Sensory deficit (decreased sensation to LUE) present.      Motor: Weakness (LUE 3/5, 5/5 RUE, RLE, LLE) and pronator drift (LUE) present. No tremor, atrophy, abnormal muscle tone or seizure activity.   Psychiatric:         Attention and Perception: Attention normal.         Mood and Affect: Affect normal.         Speech: Speech normal.         Behavior: Behavior normal. Behavior is cooperative.          NEUROLOGICAL EXAMINATION:     MENTAL STATUS   Oriented to person, place, and time.   Speech: speech is normal     CRANIAL NERVES     CN III, IV, VI   Pupils are equal, round, and reactive to light.      Significant Labs: All pertinent lab results from the past 24 hours have been reviewed.    Significant Imaging: I have reviewed all pertinent imaging results/findings within the past 24 hours.

## 2025-03-14 NOTE — PROGRESS NOTES
Patient seen with MD during rounds   12:00  Now with left sided neglect  -per family report at bedside, she was normal just prior to our rounds  -per nursing, she was last seen at 10:44 am doing swallow screen without left sided neglect    -CTA head and neck and CT Perfusion    -further recommendations after scans complete

## 2025-03-14 NOTE — PLAN OF CARE
03/14/25 1521   Discharge Assessment   Assessment Type Discharge Planning Assessment   Confirmed/corrected address, phone number and insurance Yes   Confirmed Demographics Correct on Facesheet   Source of Information family   If unable to respond/provide information was family/caregiver contacted? Yes   Contact Name/Number josé miguel sellers, son, [phone] and korina new, dtr/POA, [phone]   Communicated JOSUÉ with patient/caregiver Date not available/Unable to determine   Reason For Admission CVA   People in Home alone   Do you expect to return to your current living situation? Other (see comments)  (tbd)   Do you have help at home or someone to help you manage your care at home? No   Prior to hospitilization cognitive status: Unable to Assess   Current cognitive status: Unable to Assess   Home Layout Able to live on 1st floor   Equipment Currently Used at Home none   Readmission within 30 days? No   Patient currently being followed by outpatient case management? No   Do you currently have service(s) that help you manage your care at home? No   Do you take prescription medications? Yes   Do you have prescription coverage? Yes   Coverage [employer] managed mcr   Do you have any problems affording any of your prescribed medications? No   Is the patient taking medications as prescribed? yes   Who is going to help you get home at discharge? family   How do you get to doctors appointments? family or friend will provide   Are you on dialysis? No   Do you take coumadin? No   Discharge Plan A Other  (tbd)   Discharge Plan B Other  (tbd)   DME Needed Upon Discharge  other (see comments)  (tbd)   Discharge Plan discussed with: Adult children   Transition of Care Barriers None     Completed assessment with pt's adult children at bedside, Frannie (POA), as pt was in MRI. Introduced self and explained role as SW. Pt verb understanding to all questions asked. PCP is [facility] and pharmacy is [facility]  [place]. D/c dispo pending.     Imelda Jordan LCSW

## 2025-03-14 NOTE — CONSULTS
"[facility]  Neurology  Consult Note    Patient Name: Amy Henderson  MRN: [id number]  Admission Date: 3/14/2025  Hospital Length of Stay: 0 days  Code Status: Full Code   Attending Provider: Mimi Kauffman MD   Consulting Provider: Evelyn Samano NP  Primary Care Physician: No primary care provider on file.  Principal Problem:<principal problem not specified>    Inpatient Consult to Neurology Services (General Neurology)  Consult performed by: Evelyn Samano NP  Consult ordered by: Anabelle Meza FNP         Subjective:     Chief Complaint:  left sided weakness, dysarthria     HPI:   89 year old female with a past medical history of HTN and HLD presented to [facility] on 3/14 as a transfer for stroke.  She reported she woke up on 3/13, had a normal day, she ate breakfast without trouble.  She reports a little after 9:30 am, she was standing at the kitchen sink doing dishes when she was having trouble gripping the coffee cup to wash it with her left hand.  She was able to walk to the bathroom, her way back from the bathroom, she fell on her knees "but did not fall hard".  She called her neighbors, she was able to unlock the door for them but them went to the chair to sit down because she was feeling dizzy.  Upon arrival to [facility] ED, she initially had facial droop and dysarthria, she was not given TNK 2/2 non-disabling symptoms and rapid improvement.  She was found to have a right M2 occlusion but no intervention 2/2 low NIH and rapid improvement.  She was transferred to [facility] for further evaluation.  She was loaded with aspirin and plavix.  Neurology was consulted for stroke workup.    Today, she reports symptoms have stayed about the same, daughter at bedside reports she is independent and still driving, requires no assistance for ADLs at her baseline.      No past medical history on file.    No past surgical history on file.    Review of patient's allergies indicates: "   Allergen Reactions    Demerol [meperidine]     Keflex [cephalexin]        Current Neurological Medications:     No current facility-administered medications on file prior to encounter.     Current Outpatient Medications on File Prior to Encounter   Medication Sig    atenoloL (TENORMIN) 100 MG tablet Take 100 mg by mouth once daily.     Family History    None       Tobacco Use    Smoking status: Not on file    Smokeless tobacco: Not on file   Substance and Sexual Activity    Alcohol use: Not on file    Drug use: Not on file    Sexual activity: Not on file     Review of Systems   Neurological:  Positive for facial asymmetry, speech difficulty, weakness, numbness and headaches. Negative for dizziness, tremors, seizures, syncope and light-headedness.   All other systems reviewed and are negative.    Objective:     Vital Signs (Most Recent):  Temp: 98.5 °F (36.9 °C) (03/14/25 0752)  Pulse: 67 (03/14/25 0752)  Resp: 16 (03/14/25 0752)  BP: (!) 175/80 (03/14/25 0752)  SpO2: (!) 94 % (03/14/25 0752) Vital Signs (24h Range):  Temp:  [97.7 °F (36.5 °C)-98.5 °F (36.9 °C)] 98.5 °F (36.9 °C)  Pulse:  [61-67] 67  Resp:  [16-18] 16  SpO2:  [94 %-95 %] 94 %  BP: (160-185)/(69-81) 175/80     Weight: 60.8 kg (134 lb 0.6 oz)  Body mass index is 24.52 kg/m².     Physical Exam  Vitals and nursing note reviewed.   Constitutional:       General: She is not in acute distress.     Appearance: Normal appearance. She is not toxic-appearing.   HENT:      Head: Normocephalic.      Right Ear: Hearing normal.      Left Ear: Hearing normal.   Eyes:      General: No visual field deficit.     Extraocular Movements:      Right eye: Normal extraocular motion and no nystagmus.      Left eye: Normal extraocular motion and no nystagmus.      Pupils: Pupils are equal, round, and reactive to light.   Cardiovascular:      Rate and Rhythm: Normal rate.   Pulmonary:      Effort: Pulmonary effort is normal.   Musculoskeletal:         General: No swelling.  Normal range of motion.      Cervical back: Normal range of motion.      Right lower leg: No edema.      Left lower leg: No edema.   Skin:     General: Skin is warm and dry.      Capillary Refill: Capillary refill takes less than 2 seconds.   Neurological:      General: No focal deficit present.      Mental Status: She is alert and oriented to person, place, and time.      Cranial Nerves: Dysarthria and facial asymmetry (left facial droop) present.      Sensory: Sensory deficit (decreased sensation to LUE) present.      Motor: Weakness (LUE 3/5, 5/5 RUE, RLE, LLE) and pronator drift (LUE) present. No tremor, atrophy, abnormal muscle tone or seizure activity.   Psychiatric:         Attention and Perception: Attention normal.         Mood and Affect: Affect normal.         Speech: Speech normal.         Behavior: Behavior normal. Behavior is cooperative.          NEUROLOGICAL EXAMINATION:     MENTAL STATUS   Oriented to person, place, and time.   Speech: speech is normal     CRANIAL NERVES     CN III, IV, VI   Pupils are equal, round, and reactive to light.      Significant Labs: All pertinent lab results from the past 24 hours have been reviewed.    Significant Imaging: I have reviewed all pertinent imaging results/findings within the past 24 hours.  Assessment and Plan:     Stroke due to occlusion of right middle cerebral artery  - presented with left sided weakness, slurred speech, left facil droop  - Stroke RF: HTN, HLD  - Intervention: none, non-disabling symptoms  - Etiology: TBD    Stroke workup:  -CTh:  negative  -CTA h/n: right M2 occlusion  -LDL: 116  -A1c: 5.5  -TSH: 1.535  -home medications include:  NIH Stroke Scale      1a  Level of consciousness: 0=alert; keenly responsive   1b. LOC questions:  0=Answers both tasks correctly   1c. LOC commands: 0=Answers both tasks correctly   2.  Best Gaze: 0=normal   3.  Visual: 0=No visual loss   4. Facial Palsy: 1=Minor paralysis (flattened nasolabial fold,  asymmetric on smiling)   5a.  Motor left arm: 1=Drift, limb holds 90 (or 45) degrees but drifts down before full 10 seconds: does not hit bed   5b.  Motor right arm: 0=No drift, limb holds 90 (or 45) degrees for full 10 seconds   6a. motor left le=No drift, limb holds 90 (or 45) degrees for full 10 seconds   6b  Motor right le=No drift, limb holds 90 (or 45) degrees for full 10 seconds   7. Limb Ataxia: 0=Absent   8.  Sensory: 1=Mild to moderate sensory loss; patient feels pinprick is less sharp or is dull on the affected side; there is a loss of superficial pain with pinprick but patient is aware She is being touched   9. Best Language:  0=No aphasia, normal   10. Dysarthria: 1=Mild to moderate, patient slurs at least some words and at worst, can be understood with some difficulty   11. Extinction and Inattention: 0=No abnormality   12. Distal motor function: 0=Normal    Total:   4         Plan:  -continue stroke workup  -continue Aspirin 81mg daily  -continue Atorvastatin 40mg daily  -PT/OT/ST to evaluate  -SCD for DVT prophylaxis   - Allow permissive HTN ... prn hydralazine and labetalol for SBP > 220 or DBP > 120     - after 24 hours from symptom onset, ok to normalize blood pressure  - Neuro checks q4hr ... stat CTh if any neuro change   - Continuous telemetry monitoring  - NPO until passes Shaikh or cleared by SLP  - No bedrest, ok for patient to ambulate, RN to observe first ambulation for safety     VTE Risk Mitigation (From admission, onward)           Ordered     Reason for No Pharmacological VTE Prophylaxis  Once        Comments: Will reassess when patient is examined   Question:  Reasons:  Answer:  Physician Provided (leave comment)    25     IP VTE HIGH RISK PATIENT  Once         25     Place sequential compression device  Until discontinued         25                  Further recommendations to follow from MD Evelyn Samano, NP  Neurology  [facility]

## 2025-03-14 NOTE — PT/OT/SLP PROGRESS
Orders received, chart reviewed, POC discussed with nursing.  SLP attempting to see pt for evaluation however pt with new L neglect onset and has strict bedrest orders for 24 hours.  SLP to complete evaluation as appropriate.

## 2025-03-15 LAB
ALBUMIN SERPL-MCNC: 3.5 G/DL (ref 3.4–4.8)
ALBUMIN/GLOB SERPL: 1.4 RATIO (ref 1.1–2)
ALP SERPL-CCNC: 107 UNIT/L (ref 40–150)
ALT SERPL-CCNC: 8 UNIT/L (ref 0–55)
ANION GAP SERPL CALC-SCNC: 7 MEQ/L
AST SERPL-CCNC: 16 UNIT/L (ref 5–34)
BASOPHILS # BLD AUTO: 0.05 X10(3)/MCL
BASOPHILS NFR BLD AUTO: 0.5 %
BILIRUB SERPL-MCNC: 1.5 MG/DL
BUN SERPL-MCNC: 15.7 MG/DL (ref 9.8–20.1)
CALCIUM SERPL-MCNC: 8.5 MG/DL (ref 8.4–10.2)
CHLORIDE SERPL-SCNC: 110 MMOL/L (ref 98–111)
CO2 SERPL-SCNC: 22 MMOL/L (ref 23–31)
CREAT SERPL-MCNC: 0.73 MG/DL (ref 0.55–1.02)
CREAT/UREA NIT SERPL: 22
EOSINOPHIL # BLD AUTO: 0.18 X10(3)/MCL (ref 0–0.9)
EOSINOPHIL NFR BLD AUTO: 1.9 %
ERYTHROCYTE [DISTWIDTH] IN BLOOD BY AUTOMATED COUNT: 13.3 % (ref 11.5–17)
GFR SERPLBLD CREATININE-BSD FMLA CKD-EPI: >60 ML/MIN/1.73/M2
GLOBULIN SER-MCNC: 2.5 GM/DL (ref 2.4–3.5)
GLUCOSE SERPL-MCNC: 94 MG/DL (ref 75–121)
HCT VFR BLD AUTO: 40.2 % (ref 37–47)
HGB BLD-MCNC: 13.8 G/DL (ref 12–16)
IMM GRANULOCYTES # BLD AUTO: 0.02 X10(3)/MCL (ref 0–0.04)
IMM GRANULOCYTES NFR BLD AUTO: 0.2 %
LYMPHOCYTES # BLD AUTO: 3.84 X10(3)/MCL (ref 0.6–4.6)
LYMPHOCYTES NFR BLD AUTO: 41.6 %
MAGNESIUM SERPL-MCNC: 1.9 MG/DL (ref 1.6–2.6)
MCH RBC QN AUTO: 28.6 PG (ref 27–31)
MCHC RBC AUTO-ENTMCNC: 34.3 G/DL (ref 33–36)
MCV RBC AUTO: 83.2 FL (ref 80–94)
MONOCYTES # BLD AUTO: 0.52 X10(3)/MCL (ref 0.1–1.3)
MONOCYTES NFR BLD AUTO: 5.6 %
NEUTROPHILS # BLD AUTO: 4.63 X10(3)/MCL (ref 2.1–9.2)
NEUTROPHILS NFR BLD AUTO: 50.2 %
NRBC BLD AUTO-RTO: 0 %
PLATELET # BLD AUTO: 136 X10(3)/MCL (ref 130–400)
PLATELETS.RETICULATED NFR BLD AUTO: 1.3 % (ref 0.9–11.2)
PMV BLD AUTO: 9.5 FL (ref 7.4–10.4)
POTASSIUM SERPL-SCNC: 3.5 MMOL/L (ref 3.5–5.1)
PROT SERPL-MCNC: 6 GM/DL (ref 5.8–7.6)
RBC # BLD AUTO: 4.83 X10(6)/MCL (ref 4.2–5.4)
SODIUM SERPL-SCNC: 139 MMOL/L (ref 136–145)
WBC # BLD AUTO: 9.24 X10(3)/MCL (ref 4.5–11.5)

## 2025-03-15 PROCEDURE — 85025 COMPLETE CBC W/AUTO DIFF WBC: CPT

## 2025-03-15 PROCEDURE — 92611 MOTION FLUOROSCOPY/SWALLOW: CPT

## 2025-03-15 PROCEDURE — 97112 NEUROMUSCULAR REEDUCATION: CPT | Mod: CO

## 2025-03-15 PROCEDURE — 36415 COLL VENOUS BLD VENIPUNCTURE: CPT

## 2025-03-15 PROCEDURE — 83735 ASSAY OF MAGNESIUM: CPT

## 2025-03-15 PROCEDURE — 21400001 HC TELEMETRY ROOM

## 2025-03-15 PROCEDURE — 80053 COMPREHEN METABOLIC PANEL: CPT

## 2025-03-15 PROCEDURE — 25000003 PHARM REV CODE 250

## 2025-03-15 PROCEDURE — 92610 EVALUATE SWALLOWING FUNCTION: CPT

## 2025-03-15 RX ADMIN — ASPIRIN 81 MG: 81 TABLET, COATED ORAL at 05:03

## 2025-03-15 RX ADMIN — ATORVASTATIN CALCIUM 40 MG: 40 TABLET, FILM COATED ORAL at 05:03

## 2025-03-15 NOTE — PT/OT/SLP PROGRESS
"Occupational Therapy   Treatment    Name: Amy Henderson  MRN: [id number]  Admitting Diagnosis:  CVA with L weakness and dysarthria found with M2 occlusion       Recommendations:     Recommended therapy intensity at discharge: High Intensity Therapy   Discharge Equipment Recommendations:   (tbd)  Barriers to discharge:       Assessment:     Amy Henderson is a 89 y.o. female with a medical diagnosis of CVA with L weakness and dysarthria found with M2 occlusion .  She presents with excellent motivation and willingness to participate in skilled OT session. Recently off of bedrest; no issues observed and events of session were reported to Nurse Espino. Performance deficits affecting function are weakness, impaired endurance, impaired self care skills, impaired functional mobility, impaired sensation, decreased upper extremity function, decreased lower extremity function, decreased coordination, impaired balance, impaired coordination, impaired fine motor.     Rehab Prognosis:  Good; patient would benefit from acute skilled OT services to address these deficits and reach maximum level of function.       Plan:     Patient to be seen 6 x/week to address the above listed problems via self-care/home management, therapeutic activities, therapeutic exercises, neuromuscular re-education  Plan of Care Expires: 04/11/25  Plan of Care Reviewed with: patient, daughter    Subjective     Pain/Comfort:  Pain Rating 1: 0/10    Objective:     Communicated with: Nurse prior to session.  Patient found HOB elevated with telemetry, peripheral IV; dgtr present, upon OT entry to room.    General Precautions: Standard, fall    Orthopedic Precautions:   Braces:    Respiratory Status: Room air  Vital Signs: Daughter reports vitals were recently taken and "they were good. All normal."     Occupational Performance:     Neuromuscular Re-education:  - Patient performs supine > sit with supervision  - Sits EOB for 2-3 mins to allow adjustment to plane " change; no reports of dizziness  - Before mobilizing, PureWick removed; patient does not feel urge to void; Nursing notified  - Patient performs sit > stand with CGA and RW  - Stands at EOB for ~60-90 seconds, no reports of dizziness or unsteadiness  - Patient ambulates ~8 feet to recliner chair, RW and CGA only. Good  maintenance on RW; no noted buckling or hyperextension of LE     -Once in chair, placed next to bed, patient performs:   - visual scanning, midline out to (L) side, then midline out to (R) side;  displays good visual field with no obvious deficits   - finger-to-nose reps with target gradually moving from midline out to patient's (L) side; displays good precision and accuracy; slight deficit in fluidity of (L)UE GMC, but overall successful    -Patient grows tearful while discussing motivation and wanting to return to previous, high level of independence. Dgtr says patient is mostly upset about not driving, but patient says that is something she can accept. Dgtr says they will have family to help when needed.     -AVILA offers encouragement regarding pt's CLOF, increased motivation, and potential for progress. Dgtr and patient are awaiting list of in-patient rehab facilities and will decide between [facility], which is where patient and family live. AVILA educates on IPR, including 3-hours daily, focus on functional abilities, etc.         Therapeutic Positioning  OT interventions performed during the course of today's session in an effort to prevent and/or reduce acquired pressure injuries:   Education was provided on benefits of and recommendations for therapeutic positioning      [facility] 6 Click ADL: 19    Patient Education:  Patient provided with verbal education education regarding OT role/goals/POC, safety awareness, and benefits of continued intensive therapy .  Understanding was verbalized.      Patient left up in chair with all lines intact, call button in reach, Nurse notified,  and dgtr present.    GOALS:   Multidisciplinary Problems       Occupational Therapy Goals          Problem: Occupational Therapy    Goal Priority Disciplines Outcome Interventions   Occupational Therapy Goal     OT, PT/OT Progressing    Description: Goals to be met by: 4/11/25     Patient will increase functional independence with ADLs by performing:    UE Dressing with Modified [place].  LE Dressing with Modified [place].  Grooming while standing with Modified [place].  Toileting from toilet with Modified [place] for hygiene and clothing management.   Toilet transfer to toilet with Modified [place]. Progress from bedside commode  Pt will be able to perform GM/FM tasks to grasp/release, open/close, and manipulate ADL items with LuE without  over/undershooting and without significantly increased time or effort during tasks.                           Time Tracking:     OT Date of Treatment: 03/15/25  OT Start Time: 1238  OT Stop Time: 1308  OT Total Time (min): 30 min    Billable Minutes:Neuromuscular Re-education 30          Number of NIKKI visits since last OT visit: 1    3/15/2025

## 2025-03-15 NOTE — PLAN OF CARE
Problem: SLP  Goal: SLP Goal  Description: LTG:  Pt will tolerate the least restrictive PO diet with no clinical signs/sx of aspiration.  STGs:  1.  Thermal stimulation, 100% swallow response with effortful swallow, less than 2 second delay  2.  Lyubov and CTAR, min assist  Outcome: Progressing       POC initiated and goals created.  Татьяна

## 2025-03-15 NOTE — PT/OT/SLP EVAL
[facility]  Speech Language Pathology Department  Clinical Swallow Evaluation    Patient Name:  Amy Henderson   MRN:  [id number]    Recommendations     General recommendations:  Modified Barium Swallow Study  Solid texture recommendation:  NPO  Liquid consistency recommendation: NPO   Medications: crushed in puree  Precautions: aspiration    History     Amy Henderson is a/n 89 y.o. female admitted with R MCA stroke.  Failed Shaikh swallow screen.    No past medical history on file.  No past surgical history on file.    Home diet texture/consistency: Regular and thin liquids  Current method of nutrition: NPO    Patient complaint: to eat/drink    Imaging   No results found for this or any previous visit.    No results found for this or any previous visit.    Results for orders placed during the hospital encounter of 03/14/25    MRI Brain Without Contrast    Narrative  EXAMINATION:  MRI BRAIN WITHOUT CONTRAST    CLINICAL HISTORY:  CVA;    TECHNIQUE:  Multiplanar, multisequence MR images of the brain were obtained without the administration of intravenous contrast.    COMPARISON:  CTA head neck dated 03/14/2025    FINDINGS:  There is restricted diffusion in the right MCA territory involving the frontal lobe and insula.  There is no evidence of significant hemorrhage.  Mild patchy T2/FLAIR hyperintense in the subcortical and periventricular white matter likely represent chronic microvascular ischemic changes    There is no mass effect or midline shift.  The basal cisterns are patent.  There is mild diffuse parenchymal volume loss.  There is no hydrocephalus or abnormal extra-axial fluid collection.  The major intracranial flow voids are patent.  There is mild sphenoid sinus mucosal thickening.    Impression  1. Acute infarct in the right MCA territory.  2. Mild chronic microvascular ischemic changes.      Electronically signed by: Iraida  Aston  Date:    03/14/2025  Time:    15:16    Subjective     Patient awake, alert, calm, and cooperative.    Patient goals: to eat/drink   Spiritual/Cultural/Latter-day Beliefs/Practices that affect care: no    Pain/Comfort: Pain Rating 1: 0/10    Respiratory Status:  room air    Restraints/positioning devices: none    Objective     ORAL MUSCULATURE  Dentition: own teeth  Secretion Management: adequate  Mucosal Quality: good  Facial Movement: reduced left  Buccal Strength & Mobility: impaired  Mandibular Strength & Mobility: WFL  Oral Labial Strength & Mobility: impaired retraction  Lingual Strength & Mobility: WFL  Vocal Quality: adequate  Volitional Cough: Able to clear secretions    PO TRIALS  Consistency Fed By Oral Symptoms Pharyngeal Symptoms   Ice chips SLP None None   Puree Self None None   Thin liquid by cup Self Anterior spillage Cough after swallow   Chewable solid Self Prolonged bolus formation/mastication  Oral residue None   Thin liquid by straw Self None Wet vocal quality after swallow     Assessment     Patient presents with signs/sx of aspiration noted at bedside.  SLP to proceed with MBS to assess current swallow function.  Patient and daughter in agreement.    Outcome Measures     Functional Oral Intake Scale: 1 - Nothing by mouth    Goals     Multidisciplinary Problems       SLP Goals       Not on file                  Education     Patient and daughter/s were provided with verbal education regarding POC.  Understanding was verbalized.    Plan     SLP Follow-Up:      Patient to be seen:      Plan of Care expires:     Plan of Care reviewed with:  patient, daughter     Time Tracking     SLP Treatment Date:   03/15/25  Speech Start Time:  1405  Speech Stop Time:  1415     Speech Total Time (min):  10 min    Billable minutes:  Swallow and Oral Function Evaluation, 10 minutes     03/15/2025

## 2025-03-15 NOTE — PROCEDURES
[facility]  Speech Language Pathology Department  Modified Barium Swallow (MBS) Study    Patient Name:  Amy Henderson   MRN:  [id number]    Recommendations     General recommendations:  dysphagia therapy and Speech/Language and Cognitive Evaluation  Repeat MBS study: 7-10 days or as clinically appropriate  Solid texture recommendation:  Minced & Moist Diet - IDDSI Level 5  Liquid consistency recommendation: Moderately thick liquids - IDDSI Level 3   Medications: crushed in puree  Swallow strategies/precautions: NO straws  General precautions: aspiration    History     Amy Henderson is a/n 89 y.o. female admitted with R MCA stroke.   No past medical history on file.  No past surgical history on file.  A MBS Study was completed to assess the efficiency of her swallow function, rule out aspiration and make recommendations regarding safe dietary consistencies, effective compensatory strategies, and safe eating environment.       Home diet texture/consistency: Regular and thin liquids  Current method of nutrition: NPO     Patient complaint: to eat/drink    Imaging   No results found for this or any previous visit.    No results found for this or any previous visit.    Results for orders placed during the hospital encounter of 03/14/25    MRI Brain Without Contrast    Narrative  EXAMINATION:  MRI BRAIN WITHOUT CONTRAST    CLINICAL HISTORY:  CVA;    TECHNIQUE:  Multiplanar, multisequence MR images of the brain were obtained without the administration of intravenous contrast.    COMPARISON:  CTA head neck dated 03/14/2025    FINDINGS:  There is restricted diffusion in the right MCA territory involving the frontal lobe and insula.  There is no evidence of significant hemorrhage.  Mild patchy T2/FLAIR hyperintense in the subcortical and periventricular white matter likely represent chronic microvascular ischemic changes    There is no mass effect or midline shift.  The basal cisterns are patent.   There is mild diffuse parenchymal volume loss.  There is no hydrocephalus or abnormal extra-axial fluid collection.  The major intracranial flow voids are patent.  There is mild sphenoid sinus mucosal thickening.    Impression  1. Acute infarct in the right MCA territory.  2. Mild chronic microvascular ischemic changes.      Electronically signed by: Iraida Clancy  Date:    03/14/2025  Time:    15:16    Subjective     Patient awake, alert, calm, and cooperative.     Patient goals: to eat/drink   Spiritual/Cultural/Sikh Beliefs/Practices that affect care: no     Pain/Comfort: Pain Rating 1: 0/10     Respiratory Status:  room air     Restraints/positioning devices: none    Fluoroscopic Findings     Oral Musculature  Dentition: own teeth  Secretion Management: adequate  Mucosal Quality: good  Facial Movement: reduced left  Buccal Strength & Mobility: impaired  Mandibular Strength & Mobility: WFL  Oral Labial Strength & Mobility: impaired retraction  Lingual Strength & Mobility: WFL  Vocal Quality: adequate  Volitional Cough: Able to clear secretions    Setup  Seated in wheelchair  Able to self feed  Adequate head control    Visualization  Lateral view    Oral Phase:   Prolonged mastication  Prolonged/disorganized bolus formation  Poor bolus cohesion  Loss of bolus control      Pharyngeal Phase:   Swallow delay with spill to the pyriform sinuses with mildly thick liquids  Reduced base of tongue retraction  Reduced hyolaryngeal excursion  Laryngeal penetration with mildly thick liquids  Consistency Fed by Laryngeal Penetration Aspiration Residue   Mildly thick liquid by cup Self During the swallow  Did NOT clear None Trace  Base of tongue and Valleculae   Puree Self None None Mild  Valleculae   Moderately thick liquid by cup Self None None Mild  Base of tongue and Valleculae   Chewable solid Self None None Mild  Valleculae       Cervical Esophageal Phase:   UES appeared to accommodate all bolus types without  stasis or retrograde movement visualized    Assessment     Patient exhibited moderate oropharyngeal dysphagia characterized by the findings noted above.  Laryngeal penetration of mildly thick liquids was visualized and did NOT clear from the laryngeal vestibule placing the patient at HIGH risk of aspiration.  Both swallow safety and efficiency are impaired.     Patient appears to be at high risk for aspiration related pneumonia when considering severity of dysphagia and complicated medical status.  Prognosis for behavioral swallow rehabilitation is good.    Outcome Measures     Functional Oral Intake Scale: 5 - Total oral diet with multiple consistencies, by requiring special preparation or compensations    Goals     Multidisciplinary Problems       SLP Goals          Problem: SLP    Goal Priority Disciplines Outcome   SLP Goal     SLP Progressing   Description: LTG:  Pt will tolerate the least restrictive PO diet with no clinical signs/sx of aspiration.  STGs:  1.  Thermal stimulation, 100% swallow response with effortful swallow, less than 2 second delay  2.  Lyubov and CTAR, min assist                     Education     Patient provided with verbal education regarding MBS results.  Understanding was verbalized.    Plan     SLP Follow-Up:  Yes    Patient to be seen:  5 x/week   Plan of Care expires:  03/29/25  Plan of Care reviewed with:  patient     Time Tracking     SLP Treatment Date:   03/15/25  Speech Start Time:  1505  Speech Stop Time:  1525     Speech Total Time (min):  20 min    Billable minutes:   Motion Fluoroscopic Evaluation, Video Recording, 20 minutes     03/15/2025

## 2025-03-15 NOTE — PROGRESS NOTES
Hospital Medicine  Progress Note    Patient Name: Amy Henderson  MRN: [id number]  Status: IP- Inpatient   Admission Date: 3/14/2025  Length of Stay: 1  Date of Service: 03/15/2025       CC: hospital follow-up for CVA       SUBJECTIVE    89 y.o. female with a limited history of HTN, HLD,  was transferred to [facility] with stroke-like symptoms.  She notes she woke up on 3/13, ate breakfast without trouble. However a little after 9:30 am, she was standing at the kitchen sink doing dishes when she was having trouble gripping with her left hand. She was able to walk to the bathroom, but on her way back, she fell to her knees and was unable to get.  She was able to call her neighbors for help however.    Evaluation at outlying ED, noted a facial droop and dysarthria.  CTA apparently noted a right M2 occlusion; but no intervention or TNK recommended s/t low NIH and rapid improvement.  She was here transferred to [facility]  early morning 3/14 for further evaluation.  She was loaded with aspirin and plavix.  Neurology was consulted and patient admitted to  services.    Today: Patient seen and examined at bedside, and chart reviewed.  Apparently had some worsening deficits yesterday afternoon, though MRI does confirm sizable right MCA stroke.  Echo was unremarkable, carotid ultrasound with < 50% stenosis bilaterally.      MEDICATIONS   Scheduled   aspirin  81 mg Oral Daily    atorvastatin  40 mg Oral Daily    mupirocin   Nasal BID     Continuous Infusions   0.9% NaCl   Intravenous Continuous 100 mL/hr at 03/14/25 2313 New Bag at 03/14/25 2313         PHYSICAL EXAM   VITALS: T 98 °F (36.7 °C)   BP (!) 174/80   P 66   RR 19   O2 (!) 94 %    GENERAL: Awake and in NAD  LUNGS: CTA anteriorly  CVS: Normal rate  GI/: Soft, NT, bowel sounds positive.  EXTREMITIES: No LE edema  NEURO: AAOx3  PSYCH: Cooperative      LABS   CBC  Recent Labs     03/14/25  0553 03/15/25  0507   WBC 10.61 9.24   RBC 5.28 4.83   HGB 14.9 13.8   HCT 42.9 40.2    MCV 81.3 83.2   MCH 28.2 28.6   MCHC 34.7 34.3   RDW 13.5 13.3    136     CHEM  Recent Labs     03/14/25  0553 03/15/25  0507    139   K 3.7 3.5   CO2 23 22*   BUN 13.3 15.7   CREATININE 0.75 0.73   GLUCOSE 97 94   CALCIUM 9.2 8.5   MG 1.90 1.90   ALBUMIN 3.8 3.5   GLOBULIN 3.4 2.5   ALKPHOS 112 107   ALT 9 8   AST 28 16   BILITOT 1.3 1.5   TSH 1.535  --      Recent Labs     03/14/25  0553   CHOL 187   TRIG 153*   HDL 40   .00      Lab Results   Component Value Date    HGBA1C 5.5 03/14/2025          DIAGNOSTICS   MRI Brain Without Contrast  Narrative:   There is restricted diffusion in the right MCA territory involving the frontal lobe and insula.  There is no evidence of significant hemorrhage.  Mild patchy T2/FLAIR hyperintense in the subcortical and periventricular white matter likely represent chronic microvascular ischemic changes    There is no mass effect or midline shift.  The basal cisterns are patent.  There is mild diffuse parenchymal volume loss.  There is no hydrocephalus or abnormal extra-axial fluid collection.  The major intracranial flow voids are patent.  There is mild sphenoid sinus mucosal thickening.  Impression:   1. Acute infarct in the right MCA territory.  2. Mild chronic microvascular ischemic changes.  Electronically signed by: Iraida Clancy  Date:    03/14/2025  Time:    15:16    Echo    Left Ventricle: The left ventricle is normal in size. Septal   thickening. There is normal systolic function with a visually estimated   ejection fraction of 65 - 70%. Grade I diastolic dysfunction.    Right Ventricle: The right ventricle is normal in size. Systolic   function is normal. TAPSE is 2.11 cm.    Left Atrium: Agitated saline study of the atrial septum is negative,   suggesting absence of intracardiac shunt at the atrial level.    Aortic Valve: There is mild aortic regurgitation.    Mitral Valve: There is mitral annular calcification. There is trace   regurgitation.     Tricuspid Valve: There is trace regurgitation.    Pulmonary Artery: The estimated pulmonary artery systolic pressure is   26 mmHg.    IVC/SVC: Normal venous pressure at 3 mmHg.    CTA Head and Neck (xpd)  Narrative:   Head CT with contrast:  There is a cortical based hypodensity in the right insula suggestive of subacute ischemic changes.  No enhancing abnormalities.  If present, stenosis of the carotid bulbs is measured based on NASCET criteria,  i.e. area of maximal stenosis compared to the cervical ICA distal to the bulb.  Cervical CTA:  The origins of the great vessels are patent mild scattered calcifications.  The common carotid, carotid bulbs and internal carotid are patent.  The vertebral arteries are patent.  Intracranial CTA:  The internal carotid arteries are patent.  There is a nonocclusive thrombus in a right MCA M2 branch.  Otherwise the middle cerebral arteries and anterior cerebral is are patent.  The vertebral arteries, basilar artery and posterior cerebral arteries are patent.  The dural venous sinuses are patent.  Impression:   1. Likely subacute ischemic changes in the right insula.  2. Nonocclusive thrombus in a right MCA M2 branch.  Electronically signed by: Iraida Clancy  Date:    03/14/2025  Time:    12:48        ASSESSMENT   Acute ischemic CVA, s/t R M2 MCA occlusion  Essential HTN  Hyperlipidemia    PLAN   Continue ASA/statin  24 hour protocol will be up soon  SLP re-evaluation thereafter  Continue with PT/OT  With a bit per neurology   Otherwise continue current management and monitoring in the interim        Prophylaxis:  B/L SCDs for now        Clement Mckay MD  [facility] Medicine    Critical Care Time: 33 minutes spent in direct hands on care, review of labs, imaging and medical record, and discussion of diagnosis, treatment, prognosis with patient/family.  Patient remains at high-risk for clinical decompensation  Critical Care diagnosis: Acute CVA

## 2025-03-16 PROBLEM — R47.1 DYSARTHRIA DUE TO ACUTE CEREBELLAR CEREBROVASCULAR ACCIDENT (CVA): Status: ACTIVE | Noted: 2025-03-16

## 2025-03-16 PROBLEM — I63.9 DYSARTHRIA DUE TO ACUTE CEREBELLAR CEREBROVASCULAR ACCIDENT (CVA): Status: ACTIVE | Noted: 2025-03-16

## 2025-03-16 PROCEDURE — 25000003 PHARM REV CODE 250

## 2025-03-16 PROCEDURE — 21400001 HC TELEMETRY ROOM

## 2025-03-16 PROCEDURE — 97116 GAIT TRAINING THERAPY: CPT | Mod: CQ

## 2025-03-16 RX ADMIN — ASPIRIN 81 MG: 81 TABLET, COATED ORAL at 10:03

## 2025-03-16 RX ADMIN — MUPIROCIN: 20 OINTMENT TOPICAL at 10:03

## 2025-03-16 RX ADMIN — ATORVASTATIN CALCIUM 40 MG: 40 TABLET, FILM COATED ORAL at 10:03

## 2025-03-16 RX ADMIN — MUPIROCIN: 20 OINTMENT TOPICAL at 08:03

## 2025-03-16 NOTE — PLAN OF CARE
Problem: Adult Inpatient Plan of Care  Goal: Plan of Care Review  Outcome: Progressing  Goal: Patient-Specific Goal (Individualized)  Outcome: Progressing  Goal: Absence of Hospital-Acquired Illness or Injury  Outcome: Progressing  Goal: Optimal Comfort and Wellbeing  Outcome: Progressing  Goal: Readiness for Transition of Care  Outcome: Progressing     Problem: Fall Injury Risk  Goal: Absence of Fall and Fall-Related Injury  Outcome: Progressing     Problem: Infection  Goal: Absence of Infection Signs and Symptoms  Outcome: Progressing     Problem: Stroke, Ischemic (Includes Transient Ischemic Attack)  Goal: Optimal Coping  Outcome: Progressing  Goal: Effective Bowel Elimination  Outcome: Progressing  Goal: Optimal Cerebral Tissue Perfusion  Outcome: Progressing  Goal: Optimal Cognitive Function  Outcome: Progressing  Goal: Improved Communication Skills  Outcome: Progressing  Goal: Optimal Functional Ability  Outcome: Progressing  Goal: Optimal Nutrition Intake  Outcome: Progressing  Goal: Effective Oxygenation and Ventilation  Outcome: Progressing  Goal: Improved Sensorimotor Function  Outcome: Progressing  Goal: Safe and Effective Swallow  Outcome: Progressing  Goal: Effective Urinary Elimination  Outcome: Progressing     Problem: Skin Injury Risk Increased  Goal: Skin Health and Integrity  Outcome: Progressing

## 2025-03-16 NOTE — PT/OT/SLP PROGRESS
Physical Therapy Treatment    Patient Name:  Amy Henderson   MRN:  [id number]    Recommendations:     Discharge therapy intensity: High Intensity Therapy   Discharge Equipment Recommendations: to be determined by next level of care  Barriers to discharge: Impaired mobility and Ongoing medical needs    Assessment:     Amy Henderson is a 89 y.o. female admitted with a medical diagnosis of CVA with L weakness and dysarthria found with M2 occlusion.  She presents with the following impairments/functional limitations: weakness, impaired sensation, impaired self care skills, impaired functional mobility, gait instability, impaired balance.    Rehab Prognosis: Good; patient would benefit from acute skilled PT services to address these deficits and reach maximum level of function.    Recent Surgery: * No surgery found *      Plan:     During this hospitalization, patient would benefit from acute PT services 6 x/week to address the identified rehab impairments via gait training, therapeutic activities, therapeutic exercises and progress toward the following goals:    Plan of Care Expires:  04/14/25    Subjective     Chief Complaint: none expressed  Patient/Family Comments/goals: to go home  Pain/Comfort:  Pain Rating 1: 0/10      Objective:     Communicated with pts nurse prior to session.  Patient found ambulatory in room/cordova with peripheral IV, telemetry upon PT entry to room.     General Precautions: Standard, aspiration  Orthopedic Precautions: N/A  Braces: N/A  Respiratory Status: Room air  Blood Pressure: 143/79  Skin Integrity: Visible skin intact      Functional Mobility:  Transfers:     Sit to Stand:  contact guard assistance with no AD and rolling walker  Bed to Chair: contact guard assistance with  no AD and rolling walker  using  Step Transfer  Gait: The pt ambulated in room/bathroom w/ RW CGA and reported she does not use AD and would like to ambulate w/o an AD.  Pt ambulated 200 ft w/ R HHA, working on raman,  ffot clearance and heel strike/  Pt performed w/o difficulty.  Will progress w/ gait as strength and balance improve.   Balance: No LOB during gait and transfers, demonstrating good trunk control.    Therapeutic Activities/Exercises:  The pt was at bathroom sink, upon arrival.  Pt assisted w/ finishing washing hands and then walked back to BS chair    Education:  Patient and family were provided with verbal education and demonstrations education regarding PT role/goals/POC, fall prevention, and safety awareness.  Understanding was verbalized, however additional teaching warranted.     Patient left up in chair with all lines intact, call button in reach, chair alarm on, nurse notified, and son present    GOALS:   Multidisciplinary Problems       Physical Therapy Goals          Problem: Physical Therapy    Goal Priority Disciplines Outcome Interventions   Physical Therapy Goal     PT, PT/OT Progressing    Description: Goals to be met by: 25     Patient will increase functional independence with mobility by performin. Supine to sit with Modified [place]  2. Sit to supine with Modified [place]  3. Sit to stand transfer with Modified [place]  4. Bed to chair transfer with Modified [place] using Rolling Walker  5. Gait  x 200 feet with Modified [place] using Rolling Walker.                          Time Tracking:     PT Received On: 25  PT Start Time: 906     PT Stop Time: 921  PT Total Time (min): 15 min     Billable Minutes: Gait Training 15 min    Treatment Type: Treatment  PT/PTA: PTA     Number of PTA visits since last PT visit: 2025

## 2025-03-16 NOTE — PLAN OF CARE
Problem: Adult Inpatient Plan of Care  Goal: Plan of Care Review  Outcome: Progressing  Flowsheets (Taken 3/16/2025 0258)  Plan of Care Reviewed With:   patient   family  Goal: Absence of Hospital-Acquired Illness or Injury  Outcome: Progressing  Intervention: Identify and Manage Fall Risk  Flowsheets (Taken 3/16/2025 0258)  Safety Promotion/Fall Prevention:   assistive device/personal item within reach   high risk medications identified   instructed to call staff for mobility   medications reviewed   lighting adjusted   nonskid shoes/socks when out of bed   Supervised toileting - stay within arms reach   side rails raised x 3   family to remain at bedside  Intervention: Prevent Skin Injury  Flowsheets (Taken 3/16/2025 0258)  Body Position: position changed independently  Skin Protection: incontinence pads utilized  Device Skin Pressure Protection:   absorbent pad utilized/changed   tubing/devices free from skin contact  Intervention: Prevent and Manage VTE (Venous Thromboembolism) Risk  Flowsheets (Taken 3/16/2025 0258)  VTE Prevention/Management:   bleeding risk factor(s) identified, provider notified   remove, assess skin, and reapply sequential compression device   dorsiflexion/plantar flexion performed   fluids promoted  Intervention: Prevent Infection  Flowsheets (Taken 3/16/2025 0258)  Infection Prevention: rest/sleep promoted     Problem: Fall Injury Risk  Goal: Absence of Fall and Fall-Related Injury  Outcome: Progressing  Intervention: Identify and Manage Contributors  Flowsheets (Taken 3/16/2025 0258)  Medication Review/Management:   medications reviewed   high-risk medications identified  Intervention: Promote Injury-Free Environment  Flowsheets (Taken 3/16/2025 0258)  Safety Promotion/Fall Prevention:   assistive device/personal item within reach   high risk medications identified   instructed to call staff for mobility   medications reviewed   lighting adjusted   nonskid shoes/socks when out of  bed   Supervised toileting - stay within arms reach   side rails raised x 3   family to remain at bedside     Problem: Stroke, Ischemic (Includes Transient Ischemic Attack)  Goal: Improved Communication Skills  Outcome: Progressing  Intervention: Optimize Communication Skills  Flowsheets (Taken 3/16/2025 0258)  Communication Enhancement Strategies:   call light answered in person   family involved in communication plan  Goal: Optimal Functional Ability  Outcome: Progressing  Goal: Safe and Effective Swallow  Outcome: Progressing  Intervention: Promote and Optimize Fluid and Food Intake  Flowsheets (Taken 3/16/2025 0258)  Aspiration Precautions: liquids thickened  Swallowing Interventions: Dysphagia:   monitored for cough during intake   monitored for fatigue   upright position maintained 30 mins after intake  Feeding/Eating Techniques: close supervision provided  Goal: Effective Urinary Elimination  Outcome: Progressing  Intervention: Promote Effective Bladder Elimination  Flowsheets (Taken 3/16/2025 0258)  Urinary Elimination Promotion:   frequent voiding encouraged   toileting offered     Problem: Skin Injury Risk Increased  Goal: Skin Health and Integrity  Outcome: Progressing  Intervention: Optimize Skin Protection  Flowsheets (Taken 3/16/2025 0258)  Pressure Reduction Techniques: frequent weight shift encouraged  Pressure Reduction Devices: positioning supports utilized  Skin Protection: incontinence pads utilized  Activity Management: Walk with assistive devise and /or staff member - L3

## 2025-03-16 NOTE — PT/OT/SLP PROGRESS
[facility]  Speech Language Pathology Department  Diet Tolerance Follow-up    Patient Name:  Amy Henderson   MRN:  [id number]  Admitting Diagnosis:     Recommendations     General recommendations:  dysphagia therapy and Speech/Language and Cognitive Evaluation   Solid texture recommendation:  Minced & Moist Diet - IDDSI Level 5  Liquid consistency recommendation: Moderately thick liquids - IDDSI Level 3   Medications: crushed in puree  Swallow strategies/precautions: NO straws  Precautions: aspiration    Diet Tolerance     Nursing reports no difficulty regarding diet tolerance.    Outcome Measures     Functional Oral Intake Scale: 5 - Total oral diet with multiple consistencies, by requiring special preparation or compensations    Patient Education     No learner present/available.    Plan     SLP Follow-Up:  Yes    Patient to be seen:  5 x/week   Plan of Care expires:  03/29/25  Plan of Care reviewed with:  patient     Time Tracking     SLP Treatment Date:      Speech Start Time:     Speech Stop Time:        Speech Total Time (min):       Billable minutes:   Not billable    03/16/2025

## 2025-03-16 NOTE — PROGRESS NOTES
Neurology  Progress Note    Patient Name: Amy Henderson  Admission Date: 3/14/2025  Hospital Length of Stay: 2 days  Code Status: DNR   Attending Provider: Clement Mckay MD  Principal Problem:<principal problem not specified>    Subjective:     Interval History: no acute events    Current Neurological Medications: none    Objective:     Vital Signs (Most Recent):  Temp: 98.4 °F (36.9 °C) (03/16/25 0700)  Pulse: 68 (03/16/25 0700)  Resp: 17 (03/16/25 1040)  BP: (!) 156/68 (03/16/25 0700)  SpO2: 95 % (03/16/25 0700) Vital Signs (24h Range):  Temp:  [97.7 °F (36.5 °C)-99.1 °F (37.3 °C)] 98.4 °F (36.9 °C)  Pulse:  [66-77] 68  Resp:  [17-18] 17  SpO2:  [94 %-97 %] 95 %  BP: (133-174)/(67-80) 156/68     Weight: 60.8 kg (134 lb 0.6 oz)    Physical Exam    Neurological Exam  Continues with moderate dysarthria but able to communicate   Moving all extremities to request w/o difficulty  Significant Labs: All pertinent lab results from the past 24 hours have been reviewed.    Significant Imaging: I have reviewed and interpreted all pertinent imaging results/findings within the past 24 hours.    Assessment and Plan:     Assessment: 89 y/o w/ Rm2 occlusion, ischemic stroke resulting in dysarthria and facial drooping, motor exam improved, ambulating w/o assistive device    Active Diagnoses:    Diagnosis Date Noted POA    Stroke due to occlusion of right middle cerebral artery [I63.511] 03/13/2025 Yes      Problems Resolved During this Admission:       Plan:  - normotension,  or less while admitted  - continue ASA 81 mg daily  - continue statin  - PT/OT/Speech therapies  - anticipate discharge to ARU vs home with outpatient speech therapies   - follow up in stroke clinic in 1-2 months     I will sign off at this time, please message with any additional questions.     Alpesh Boogie MD  Neurology

## 2025-03-16 NOTE — PROGRESS NOTES
Hospital Medicine  Progress Note    Patient Name: Amy Henderson  MRN: [id number]  Status: IP- Inpatient   Admission Date: 3/14/2025  Length of Stay: 2  Date of Service: 03/16/2025       CC: hospital follow-up for CVA       SUBJECTIVE    89 y.o. female with a limited history of HTN, HLD,  was transferred to [facility] with stroke-like symptoms.  She notes she woke up on 3/13, ate breakfast without trouble. However a little after 9:30 am, she was standing at the kitchen sink doing dishes when she was having trouble gripping with her left hand. She was able to walk to the bathroom, but on her way back, she fell to her knees and was unable to get.  She was able to call her neighbors for help however.    Evaluation at outlying ED, noted a facial droop and dysarthria.  CTA apparently noted a right M2 occlusion; but no intervention or TNK recommended s/t low NIH and rapid improvement.  She was here transferred to [facility]  early morning 3/14 for further evaluation.  She was loaded with aspirin and plavix.  Neurology was consulted and patient admitted to  services.  MRI confirms right territory ischemic MCA stroke.  Echo was unremarkable, carotid ultrasound with < 50% stenosis bilaterally.  A1c 5.5, .    Today: Patient seen and examined at bedside, and chart reviewed.  Doing better today, able to ambulate down the cordova with therapy.      MEDICATIONS   Scheduled   aspirin  81 mg Oral Daily    atorvastatin  40 mg Oral Daily    mupirocin   Nasal BID     Continuous Infusions   0.9% NaCl   Intravenous Continuous 100 mL/hr at 03/14/25 2313 New Bag at 03/14/25 2313       PHYSICAL EXAM   VITALS: T 98.4 °F (36.9 °C)   /69   P 77   RR 18   O2 95 %    GENERAL: Awake and in NAD  LUNGS: CTA anteriorly  CVS: Normal rate  GI/: Soft, NT, bowel sounds positive.  EXTREMITIES: No LE edema  NEURO: AAOx3, speech slightly slurred, but improved  PSYCH: Cooperative      LABS   CBC  Recent Labs     03/14/25  0553 03/15/25  0507   WBC 10.61  9.24   RBC 5.28 4.83   HGB 14.9 13.8   HCT 42.9 40.2   MCV 81.3 83.2   MCH 28.2 28.6   MCHC 34.7 34.3   RDW 13.5 13.3    136     CHEM  Recent Labs     03/14/25  0553 03/15/25  0507    139   K 3.7 3.5   CO2 23 22*   BUN 13.3 15.7   CREATININE 0.75 0.73   GLUCOSE 97 94   CALCIUM 9.2 8.5   MG 1.90 1.90   ALBUMIN 3.8 3.5   GLOBULIN 3.4 2.5   ALKPHOS 112 107   ALT 9 8   AST 28 16   BILITOT 1.3 1.5   TSH 1.535  --      Recent Labs     03/14/25  0553   CHOL 187   TRIG 153*   HDL 40   .00          ASSESSMENT   Acute ischemic CVA, s/t R M2 MCA occlusion  OP dysphagia s/t above  Essential HTN  Hyperlipidemia    PLAN   Continue ASA/statin  Cleared for dysphagia diet  Continue with PT/OT  Discharge dispo TBD with further therapy, will reassess tomorrow  Otherwise continue current management and monitoring in the interim        Prophylaxis:  B/L SCDs for now        Clement Mckay MD  [facility] Medicine

## 2025-03-17 PROCEDURE — 25000003 PHARM REV CODE 250: Performed by: INTERNAL MEDICINE

## 2025-03-17 PROCEDURE — 25000003 PHARM REV CODE 250

## 2025-03-17 PROCEDURE — 94760 N-INVAS EAR/PLS OXIMETRY 1: CPT

## 2025-03-17 PROCEDURE — 99900031 HC PATIENT EDUCATION (STAT)

## 2025-03-17 PROCEDURE — 92523 SPEECH SOUND LANG COMPREHEN: CPT

## 2025-03-17 PROCEDURE — 97116 GAIT TRAINING THERAPY: CPT | Mod: CQ

## 2025-03-17 PROCEDURE — 21400001 HC TELEMETRY ROOM

## 2025-03-17 RX ORDER — CLONIDINE HYDROCHLORIDE 0.1 MG/1
0.1 TABLET ORAL EVERY 6 HOURS PRN
Status: DISCONTINUED | OUTPATIENT
Start: 2025-03-17 | End: 2025-03-18 | Stop reason: HOSPADM

## 2025-03-17 RX ORDER — AMLODIPINE BESYLATE 5 MG/1
5 TABLET ORAL DAILY
Status: DISCONTINUED | OUTPATIENT
Start: 2025-03-17 | End: 2025-03-18 | Stop reason: HOSPADM

## 2025-03-17 RX ADMIN — MUPIROCIN: 20 OINTMENT TOPICAL at 09:03

## 2025-03-17 RX ADMIN — MUPIROCIN: 20 OINTMENT TOPICAL at 08:03

## 2025-03-17 RX ADMIN — AMLODIPINE BESYLATE 5 MG: 5 TABLET ORAL at 04:03

## 2025-03-17 RX ADMIN — ASPIRIN 81 MG: 81 TABLET, COATED ORAL at 09:03

## 2025-03-17 RX ADMIN — BISACODYL 10 MG: 10 SUPPOSITORY RECTAL at 09:03

## 2025-03-17 RX ADMIN — ATORVASTATIN CALCIUM 40 MG: 40 TABLET, FILM COATED ORAL at 09:03

## 2025-03-17 NOTE — PLAN OF CARE
Problem: Adult Inpatient Plan of Care  Goal: Absence of Hospital-Acquired Illness or Injury  Outcome: Progressing  Intervention: Identify and Manage Fall Risk  Flowsheets (Taken 3/17/2025 1419)  Safety Promotion/Fall Prevention:   assistive device/personal item within reach   family to remain at bedside   family expresses understanding of fall risk and prevention   Fall Risk signage in place   Fall Risk reviewed with patient/family   instructed to call staff for mobility   lighting adjusted   medications reviewed   nonskid shoes/socks when out of bed   side rails raised x 2  Intervention: Prevent Skin Injury  Flowsheets (Taken 3/17/2025 1419)  Body Position: position changed independently  Skin Protection: incontinence pads utilized  Device Skin Pressure Protection:   absorbent pad utilized/changed   tubing/devices free from skin contact  Intervention: Prevent and Manage VTE (Venous Thromboembolism) Risk  Flowsheets (Taken 3/17/2025 1419)  VTE Prevention/Management:   ROM (passive) performed   ROM (active) performed   fluids promoted   bleeding precautions maintained   remove, assess skin, and reapply sequential compression device   ambulation promoted  Intervention: Prevent Infection  Flowsheets (Taken 3/17/2025 1419)  Infection Prevention: hand hygiene promoted

## 2025-03-17 NOTE — PLAN OF CARE
Received call from pt's niece (Angela at [facility] in [place]) stating pt's dtrGabriela, call and requested referral be sent to rehab. SW sent referral via Epic; however, SW explained that, after speaking with therapy, pt is no longer rehab appropriate. Will awaiting update from Angela to determine if they will accept.     Imelda Jordan, ULISES    3217 Contacted pt's dtr/Gabriela JAMIL, and confirmed they would like to get pt into [facility] if possible. Awaiting update from Angela in admissions regarding referral.     1510 Received update from Angela at [facility] stating they have clinically accepted and will submit for auth.

## 2025-03-17 NOTE — PT/OT/SLP PROGRESS
"Physical Therapy Treatment    Patient Name:  Amy Henderson   MRN:  [id number]    Recommendations:     Discharge therapy intensity: Low Intensity Therapy (with supervision)   Discharge Equipment Recommendations: to be determined by next level of care  Barriers to discharge: None    Assessment:     Amy Henderson is a 89 y.o. female admitted with a medical diagnosis of  CVA with L weakness and dysarthria found with M2 occlusion .  She presents with the following impairments/functional limitations: weakness, impaired sensation, impaired self care skills, impaired functional mobility, gait instability, impaired balance .    Changing d/c rec to low intensity 2/2 patient progressing well. Patient safe to d/c home with supervision.     Rehab Prognosis: Good; patient would benefit from acute skilled PT services to address these deficits and reach maximum level of function.    Recent Surgery: * No surgery found *      Plan:     During this hospitalization, patient would benefit from acute PT services 6 x/week to address the identified rehab impairments via gait training, therapeutic activities, therapeutic exercises and progress toward the following goals:    Plan of Care Expires:  04/14/25    Subjective     Chief Complaint: "I want to go home"  Patient/Family Comments/goals: to go home  Pain/Comfort:  Pain Rating 1: 0/10      Objective:     Communicated with nurse prior to session.  Patient found HOB elevated with peripheral IV, telemetry upon PT entry to room.     General Precautions: Standard, aspiration  Orthopedic Precautions: N/A  Braces: N/A  Respiratory Status: Room air  Blood Pressure: 146/72mmHg  Skin Integrity: Visible skin intact      Functional Mobility:  Bed Mobility:     Scooting: modified independence  Supine to Sit: modified independence  Sit to Supine: modified independence  Transfers:     Sit to Stand:  stand by assistance with no AD and hand-held assist  Toilet Transfer: supervision with  hand-held assist  " using  Step Transfer  Gait: patient amb 200ft with HHA-no AD CGA    Therapeutic Activities/Exercises:  Patient performed 3 mini squats with CGA     Education:  Patient provided with verbal education education regarding PT role/goals/POC, post-op precautions, fall prevention, and safety awareness.  Understanding was verbalized.     Patient left HOB elevated with all lines intact, call button in reach, and daughter in law present    GOALS:   Multidisciplinary Problems       Physical Therapy Goals          Problem: Physical Therapy    Goal Priority Disciplines Outcome Interventions   Physical Therapy Goal     PT, PT/OT Progressing    Description: Goals to be met by: 25     Patient will increase functional independence with mobility by performin. Supine to sit with Modified [place]  2. Sit to supine with Modified [place]  3. Sit to stand transfer with Modified [place]  4. Bed to chair transfer with Modified [place] using Rolling Walker  5. Gait  x 200 feet with Modified [place] using Rolling Walker.                          Time Tracking:     PT Received On: 25  PT Start Time: 1100     PT Stop Time: 1123  PT Total Time (min): 23 min     Billable Minutes: Gait Training 23    Treatment Type: Treatment  PT/PTA: PTA     Number of PTA visits since last PT visit: 2     2025

## 2025-03-17 NOTE — PLAN OF CARE
Problem: SLP  Goal: SLP Goal  Description: LTG:  Pt will tolerate the least restrictive PO diet with no clinical signs/sx of aspiration.  STGs:  1.  Thermal stimulation, 100% swallow response with effortful swallow, less than 2 second delay  2.  Lyubov and CTAR, min assist    LTG:  Pt will improve communication effectiveness to meet complex ADLs.  STGs:  1.  Speech intelligibility, over articulation with min assist, 100% intelligibility in unknown context  Outcome: Progressing       POC and goals updated.  Татьяна

## 2025-03-17 NOTE — PT/OT/SLP EVAL
[facility]  Speech Language Pathology Department  Cognitive-Communication Evaluation    Patient Name:  Amy Henderson   MRN:  [id number]    Recommendations     General recommendations:  dysphagia therapy and cognitive-communication therapy  Communication strategies:  go to room if call light pushed    Discharge therapy intensity: High Intensity Therapy  Barriers to safe discharge: acuity of illness    History     Amy Henderson is a/n 89 y.o. female admitted with R MCA stroke     No past medical history on file.  No past surgical history on file.    Previous level of Function  Education:some college  Occupation: retired  Lives: alone  Handed: Right  Glasses: yes (reading)  Hearing Aids: yes--not present  Home Responsibilities:  independent    Imaging   Results for orders placed during the hospital encounter of 03/14/25    MRI Brain Without Contrast    Narrative  EXAMINATION:  MRI BRAIN WITHOUT CONTRAST    CLINICAL HISTORY:  CVA;    TECHNIQUE:  Multiplanar, multisequence MR images of the brain were obtained without the administration of intravenous contrast.    COMPARISON:  CTA head neck dated 03/14/2025    FINDINGS:  There is restricted diffusion in the right MCA territory involving the frontal lobe and insula.  There is no evidence of significant hemorrhage.  Mild patchy T2/FLAIR hyperintense in the subcortical and periventricular white matter likely represent chronic microvascular ischemic changes    There is no mass effect or midline shift.  The basal cisterns are patent.  There is mild diffuse parenchymal volume loss.  There is no hydrocephalus or abnormal extra-axial fluid collection.  The major intracranial flow voids are patent.  There is mild sphenoid sinus mucosal thickening.    Impression  1. Acute infarct in the right MCA territory.  2. Mild chronic microvascular ischemic changes.      Electronically signed by: Iraida Clancy  Date:    03/14/2025  Time:    15:16    Subjective      Patient awake, alert, calm, and cooperative.  Patient goals: none stated   Spiritual/Cultural/Sabianist Beliefs/Practices that affect care: no    Pain/Comfort: Pain Rating 1: 0/10    Respiratory Status:  room air    Objective     ORAL MUSCULATURE  Dentition: own teeth  Secretion Management: adequate  Mucosal Quality: good  Facial Movement: reduced left  Buccal Strength & Mobility: impaired  Mandibular Strength & Mobility: WFL  Oral Labial Strength & Mobility: impaired retraction  Lingual Strength & Mobility: WFL  Vocal Quality: adequate  Volitional Cough: Able to clear secretions    SPEECH PRODUCTION  Phoneme Production: imprecise consonant production  Voice Quality: adequate  Voice Production: adequate  Speech Rate: appropriate  Loudness: acceptable  Respiration: WFL for speech  Resonance: adequate  Prosody: adequate  Speech Intelligibility  Known Context: Greater that 90%  Unknown Context: 75%-90%    AUDITORY COMPREHENSION  Following Directions:  1-Step: 100%  2-Step: 100%  Yes/No Questions:  Biographical: 100%  Environmental: 100%  Simple: 100%  Complex: 100%    VERBAL EXPRESSION  Automatic Speech:  Days of the week: Within Functional Limits  Counting: Within Functional Limits  Repetition:  Multi-syllabic words: Impaired due to L weakness  Confrontation Naming  Objects: Within Functional Limits  Wh- Questions:  Object name: 100%  Object function: 100%    COGNITION  Orientation:  Person: yes  Place: yes  Time: yes  Situation: yes   Attention:  Focused: Within Functional Limits  Sustained: Within Functional Limits  Selective: Within Functional Limits  Pragmatics:  Eye contact: Within Functional Limits  Personal space: Within Functional Limits  Facial expression: Within Functional Limits  Communicative Intent: Within Functional Limits  Memory:  Immediate: Within Functional Limits  Short Term: Within Functional Limits (11/12 on Four Word Memory Task)  Long Term: Within Functional Limits  Problem  Solving  Functional simple: Within Functional Limits  Functional complex: Within Functional Limits  Money Management: Within Functional Limits  Organization:  Convergent thinking: Within Functional Limits  Divergent thinking: Within Functional Limits  Executive Function:  Attention to detail: Within Functional Limits  Cognitive endurance: Within Functional Limits  Information processing: Within Functional Limits    Assessment     Patient presents with cognitive linguistic abilities WFL however dysarthria noted impacting intelligibility in unknown contexts.  Skilled SLP services warranted to tx impairments.  SLP to also continue dysphagia POC.    Goals     Multidisciplinary Problems       SLP Goals          Problem: SLP    Goal Priority Disciplines Outcome   SLP Goal     SLP Progressing   Description: LTG:  Pt will tolerate the least restrictive PO diet with no clinical signs/sx of aspiration.  STGs:  1.  Thermal stimulation, 100% swallow response with effortful swallow, less than 2 second delay  2.  Lyubov and CTAR, min assist    LTG:  Pt will improve communication effectiveness to meet complex ADLs.  STGs:  1.  Speech intelligibility, over articulation with min assist, 100% intelligibility in unknown context                     Patient Education     Patient and family were provided with verbal education regarding POC and over articulation strategies.  Understanding was verbalized.    Plan     SLP Follow-Up:  Yes   Patient to be seen:  5 x/week   Plan of Care expires:  03/31/25  Plan of Care reviewed with:  patient, family      Time Tracking     SLP Treatment Date:   03/17/25  Speech Start Time:  1140  Speech Stop Time:  1155     Speech Total Time (min):  15 min    Billable minutes:  Evaluation of Speech Sound Production with Comprehension and Expression, 15 minutes     03/17/2025

## 2025-03-18 VITALS
HEART RATE: 62 BPM | OXYGEN SATURATION: 96 % | BODY MASS INDEX: 24.67 KG/M2 | SYSTOLIC BLOOD PRESSURE: 149 MMHG | DIASTOLIC BLOOD PRESSURE: 63 MMHG | RESPIRATION RATE: 18 BRPM | WEIGHT: 134.06 LBS | HEIGHT: 62 IN | TEMPERATURE: 98 F

## 2025-03-18 LAB
LEFT CCA DIST DIAS: 5 CM/S
LEFT CCA DIST SYS: 77 CM/S
LEFT CCA PROX DIAS: 5 CM/S
LEFT CCA PROX SYS: 77 CM/S
LEFT ECA DIAS: 0 CM/S
LEFT ECA SYS: 80 CM/S
LEFT ICA DIST DIAS: 10 CM/S
LEFT ICA DIST SYS: 74 CM/S
LEFT ICA MID DIAS: 10 CM/S
LEFT ICA MID SYS: 56 CM/S
LEFT ICA PROX DIAS: 5 CM/S
LEFT ICA PROX SYS: 61 CM/S
LEFT VERTEBRAL DIAS: 7 CM/S
LEFT VERTEBRAL SYS: 50 CM/S
OHS CV CAROTID RIGHT ICA EDV HIGHEST: 11
OHS CV CAROTID ULTRASOUND LEFT ICA/CCA RATIO: 0.96
OHS CV CAROTID ULTRASOUND RIGHT ICA/CCA RATIO: 0.99
OHS CV PV CAROTID LEFT HIGHEST CCA: 77
OHS CV PV CAROTID LEFT HIGHEST ICA: 74
OHS CV PV CAROTID RIGHT HIGHEST CCA: 83
OHS CV PV CAROTID RIGHT HIGHEST ICA: 69
OHS CV US CAROTID LEFT HIGHEST EDV: 10
RIGHT CCA DIST DIAS: 7 CM/S
RIGHT CCA DIST SYS: 70 CM/S
RIGHT CCA PROX DIAS: 0 CM/S
RIGHT CCA PROX SYS: 83 CM/S
RIGHT ECA DIAS: 0 CM/S
RIGHT ECA SYS: 128 CM/S
RIGHT ICA DIST DIAS: 9 CM/S
RIGHT ICA DIST SYS: 69 CM/S
RIGHT ICA MID DIAS: 11 CM/S
RIGHT ICA MID SYS: 63 CM/S
RIGHT ICA PROX DIAS: 7 CM/S
RIGHT ICA PROX SYS: 51 CM/S
RIGHT VERTEBRAL DIAS: 4 CM/S
RIGHT VERTEBRAL SYS: 59 CM/S

## 2025-03-18 PROCEDURE — 25000003 PHARM REV CODE 250

## 2025-03-18 PROCEDURE — 25000003 PHARM REV CODE 250: Performed by: INTERNAL MEDICINE

## 2025-03-18 PROCEDURE — 97535 SELF CARE MNGMENT TRAINING: CPT

## 2025-03-18 RX ORDER — AMLODIPINE BESYLATE 5 MG/1
10 TABLET ORAL DAILY
Start: 2025-03-19 | End: 2026-03-19

## 2025-03-18 RX ORDER — DOCUSATE SODIUM 100 MG/1
100 CAPSULE, LIQUID FILLED ORAL 2 TIMES DAILY
Start: 2025-03-18

## 2025-03-18 RX ORDER — ASPIRIN 81 MG/1
81 TABLET ORAL DAILY
Start: 2025-03-19 | End: 2026-03-19

## 2025-03-18 RX ORDER — ATORVASTATIN CALCIUM 40 MG/1
40 TABLET, FILM COATED ORAL DAILY
Start: 2025-03-19 | End: 2026-03-19

## 2025-03-18 RX ADMIN — ASPIRIN 81 MG: 81 TABLET, COATED ORAL at 10:03

## 2025-03-18 RX ADMIN — MUPIROCIN: 20 OINTMENT TOPICAL at 10:03

## 2025-03-18 RX ADMIN — ATORVASTATIN CALCIUM 40 MG: 40 TABLET, FILM COATED ORAL at 10:03

## 2025-03-18 RX ADMIN — AMLODIPINE BESYLATE 5 MG: 5 TABLET ORAL at 10:03

## 2025-03-18 NOTE — PLAN OF CARE
Received update from Angela at [facility] rehab stating pt has received auth and can admit to rehab today. SHRUTI notified MD and met with pt at bedside to update. Pt verb understanding. SW attempted to contact dtrGabriela, but had to leave . Plan will be for pt to transport via personal vehicle.     Imelda Jordan, LCSW    1030 Met with pt and dtGabriela zee, at bedside to update. Confirmed plan is to go to [facility] via personal transport.    no

## 2025-03-18 NOTE — PT/OT/SLP PROGRESS
Occupational Therapy   Treatment    Name: Amy Henderson  MRN: [id number]    Recommendations:     Recommended therapy intensity at discharge: Low Intensity Therapy   Discharge Equipment Recommendations:  walker, rolling, shower chair  Barriers to discharge:  None    Assessment:     Amy Henderson is a 89 y.o. female with a medical diagnosis of CVA with L weakness and dysarthria found with M2 occlusion. She presents with increased endurance and indep with ADL tasks.   Performance deficits affecting function are weakness, impaired endurance, impaired self care skills, impaired functional mobility, impaired sensation, decreased upper extremity function, decreased lower extremity function, decreased coordination, impaired balance, impaired coordination, impaired fine motor.     Rehab Prognosis:  Good; patient would benefit from acute skilled OT services to address these deficits and reach maximum level of function.       Plan:     Patient to be seen 6 x/week to address the above listed problems via self-care/home management, therapeutic activities, therapeutic exercises, neuromuscular re-education  Plan of Care Expires: 04/11/25  Plan of Care Reviewed with: patient    Subjective     Pain/Comfort:  Pain Rating 1: 0/10    Objective:     Communicated with: nurse prior to session.  Patient found ambulatory in room/[facility] with peripheral IV, telemetry, pulse ox (continuous) upon OT entry to room.    General Precautions: Standard, fall    Orthopedic Precautions: none  Braces: none  Respiratory Status: Room air     Occupational Performance:     Functional Mobility/Transfers:  Patient completed Sit <> Stand Transfer with supervision  with  rolling walker   Patient completed Toilet Transfer Step Transfer technique with supervision with  rolling walker  Functional Mobility: no LOB; good safety with RW    Activities of Daily Living:  Grooming: independence able to perform oral care, wash face, and comb hair while standing at sink ~10  min with good endurance and balance  Lower Body Dressing: independence rosa/doff B socks  Toileting: independence voided on toilet and able to perform hygiene    [facility] 6 Click ADL: 24    Patient Education:  Patient provided with verbal education education regarding OT role/goals/POC, fall prevention, safety awareness, and Discharge/DME recommendations.  Understanding was verbalized.      Patient left up in chair with all lines intact, call button in reach, and nurse notified.    GOALS:   Multidisciplinary Problems       Occupational Therapy Goals          Problem: Occupational Therapy    Goal Priority Disciplines Outcome Interventions   Occupational Therapy Goal     OT, PT/OT Progressing    Description: Goals to be met by: 4/11/25     Patient will increase functional independence with ADLs by performing:    UE Dressing with Modified [place].  LE Dressing with Modified [place].  Grooming while standing with Modified [place].  Toileting from toilet with Modified [place] for hygiene and clothing management.   Toilet transfer to toilet with Modified [place]. Progress from bedside commode  Pt will be able to perform GM/FM tasks to grasp/release, open/close, and manipulate ADL items with LuE without  over/undershooting and without significantly increased time or effort during tasks.                           Time Tracking:     OT Date of Treatment: 03/18/25  OT Start Time: 0928  OT Stop Time: 0945  OT Total Time (min): 17 min    Billable Minutes:Self Care/Home Management 17    OT/NIKKI: OT     Number of NIKKI visits since last OT visit: 2    3/18/2025

## 2025-03-18 NOTE — NURSING
Report called to Areli @ [facility]'s rehab. She is aware that patient is on her way

## 2025-03-18 NOTE — PLAN OF CARE
03/18/25 1324   Final Note   Assessment Type Final Discharge Note   Anticipated Discharge Disposition Rehab   Hospital Resources/Appts/Education Provided Post-Acute resouces added to AVS   Post-Acute Status   Post-Acute Authorization Placement   Post-Acute Placement Status Set-up Complete/Auth obtained   Discharge Delays None known at this time     Pt discharging to [facility]. Transport via personal vehicle. Packet given to pt's nurse. SW met with pt and dtr and updated at bedside.     Imelda Jordan LCSW

## 2025-03-18 NOTE — PLAN OF CARE
Problem: Adult Inpatient Plan of Care  Goal: Plan of Care Review  Outcome: Met  Goal: Patient-Specific Goal (Individualized)  Outcome: Met  Goal: Absence of Hospital-Acquired Illness or Injury  Outcome: Met  Goal: Optimal Comfort and Wellbeing  Outcome: Met  Goal: Readiness for Transition of Care  Outcome: Met     Problem: Fall Injury Risk  Goal: Absence of Fall and Fall-Related Injury  Outcome: Met     Problem: Infection  Goal: Absence of Infection Signs and Symptoms  Outcome: Met     Problem: Stroke, Ischemic (Includes Transient Ischemic Attack)  Goal: Optimal Coping  Outcome: Met  Goal: Effective Bowel Elimination  Outcome: Met  Goal: Optimal Cerebral Tissue Perfusion  Outcome: Met  Goal: Optimal Cognitive Function  Outcome: Met  Goal: Improved Communication Skills  Outcome: Met  Goal: Optimal Functional Ability  Outcome: Met  Goal: Optimal Nutrition Intake  Outcome: Met  Goal: Effective Oxygenation and Ventilation  Outcome: Met  Goal: Improved Sensorimotor Function  Outcome: Met  Goal: Safe and Effective Swallow  Outcome: Met  Goal: Effective Urinary Elimination  Outcome: Met     Problem: Skin Injury Risk Increased  Goal: Skin Health and Integrity  Outcome: Met

## 2025-03-19 NOTE — DISCHARGE SUMMARY
Hospital Medicine  Discharge Summary    Patient Name: Amy Henderson  MRN: [id number]  Admit Date: 3/14/2025  Discharge Date: 3/18/2025   Status: IP- Inpatient   Length of Stay: 4      PHYSICIANS   Admitting Physician: Clement Mckay MD  Discharging Physician: Clement Mckay MD.  Primary Care Physician: No primary care provider on file.  Consults: Neurology      DISCHARGE DIAGNOSES   Acute ischemic CVA, s/t R M2 MCA occlusion  OP dysphagia s/t above  Essential HTN  Hyperlipidemia      PROCEDURES   None      HOSPITAL COURSE     89 y.o. female with a limited history of HTN, HLD,  was transferred to [facility] with stroke-like symptoms.  She notes she woke up on 3/13, ate breakfast without trouble. However a little after 9:30 am, she was standing at the kitchen sink doing dishes when she was having trouble gripping with her left hand. She was able to walk to the bathroom, but on her way back, she fell to her knees and was unable to get.  She was able to call her neighbors for help however.    Evaluation at outlying ED, noted a facial droop and dysarthria.  CTA apparently noted a right M2 occlusion; but no intervention or TNK recommended s/t low NIH and rapid improvement.  She was here transferred to [facility]  early morning 3/14 for further evaluation.  She was loaded with aspirin and plavix.  Neurology was consulted and patient admitted to  services.  MRI confirms right territory ischemic MCA stroke.  Echo was unremarkable, carotid ultrasound with < 50% stenosis bilaterally.  A1c 5.5, .  Continued on ASA/statin.  Pressures accelerated slightly, started on Norvasc.  Participating with therapy.  CM consulted for rehab placement.  Patient accepted and stable for transfer.       STATUS  Improved    DISPOSITION  Discharge to Inpatient Rehab    DIET  Cardiac    ACTIVITY  As tolerated      FOLLOW-UP      Abby Umanzor FNP Follow up on 6/18/2025.    Specialty: Neurology  Why: Follow up in stroke clinic within 3 months of  d/c, with Abby Umanzor NP  Scheduled for Wed. June 18 at 10:30.   Contact information:  [address] [place] [address] [address] [address] [phone]                  Destination       [facility] .    Service: Inpatient Rehabilitation  Contact information:  [address] [facility] [address] [phone]                       DISCHARGE MEDICATION RECONCILIATION     PRESCRIBED     amLODIPine 5 MG tablet  Commonly known as: NORVASC  Take 2 tablets (10 mg total) by mouth once daily.     aspirin 81 MG EC tablet  Commonly known as: ECOTRIN  Take 1 tablet (81 mg total) by mouth once daily.     atorvastatin 40 MG tablet  Commonly known as: LIPITOR  Take 1 tablet (40 mg total) by mouth once daily.     docusate sodium 100 MG capsule  Commonly known as: COLACE  Take 1 capsule (100 mg total) by mouth 2 (two) times daily.       DISCONTINUE     atenoloL 100 MG tablet  Commonly known as: TENORMIN              PHYSICAL EXAM   VITALS: T 97.5 °F (36.4 °C)   BP (!) 149/63   P 62   RR 18   O2 96 %    GENERAL: Awake and in NAD  LUNGS: CTA anteriorly  CVS: Normal rate  GI/: Soft, NT, bowel sounds positive.  EXTREMITIES: No LE edema  NEURO: AAOx3, speech slightly slurred, but comprehensible  PSYCH: Cooperative        Discharge time: 33 minutes     Clement Mckay MD  [facility]       DIAGNOSITCS   CBC:   Recent Labs   Lab 03/14/25  0553 03/15/25  0507   WBC 10.61 9.24   HGB 14.9 13.8   HCT 42.9 40.2    136       CMP:   Recent Labs   Lab 03/14/25  0553 03/15/25  0507   CALCIUM 9.2 8.5   ALBUMIN 3.8 3.5    139   K 3.7 3.5   CO2 23 22*    110   BUN 13.3 15.7   CREATININE 0.75 0.73   ALKPHOS 112 107   ALT 9 8   AST 28 16   BILITOT 1.3 1.5   MG 1.90 1.90     Estimated Creatinine Clearance: 44 mL/min (based on SCr of 0.73 mg/dL).    COAG:  Recent Labs   Lab 03/14/25  0553   APTT 26.2   INR 1.1        Lab Results   Component Value Date    HGBA1C 5.5 03/14/2025          CV  Ultrasound Bilateral Doppler Carotid  Result Date: 3/18/2025  The right internal carotid artery is patent with less than 50% stenosis.   The left internal carotid artery is patent with less than 50% stenosis.   Bilateral vertebral arteries are patent with antegrade flow.      MRI Brain Without Contrast  Result Date: 3/14/2025  EXAMINATION: MRI BRAIN WITHOUT CONTRAST CLINICAL HISTORY: CVA; TECHNIQUE: Multiplanar, multisequence MR images of the brain were obtained without the administration of intravenous contrast. COMPARISON: CTA head neck dated 03/14/2025 FINDINGS: There is restricted diffusion in the right MCA territory involving the frontal lobe and insula.  There is no evidence of significant hemorrhage.  Mild patchy T2/FLAIR hyperintense in the subcortical and periventricular white matter likely represent chronic microvascular ischemic changes There is no mass effect or midline shift.  The basal cisterns are patent.  There is mild diffuse parenchymal volume loss.  There is no hydrocephalus or abnormal extra-axial fluid collection.  The major intracranial flow voids are patent.  There is mild sphenoid sinus mucosal thickening.   Impression:  1. Acute infarct in the right MCA territory.   2. Mild chronic microvascular ischemic changes.   Electronically signed by: Iraida Clancy Date:    03/14/2025 Time:    15:16    Echo  Result Date: 3/14/2025    Left Ventricle: The left ventricle is normal in size. Septal thickening. There is normal systolic function with a visually estimated ejection fraction of 65 - 70%. Grade I diastolic dysfunction.     Right Ventricle: The right ventricle is normal in size. Systolic function is normal. TAPSE is 2.11 cm.     Left Atrium: Agitated saline study of the atrial septum is negative, suggesting absence of intracardiac shunt at the atrial level.     Aortic Valve: There is mild aortic regurgitation.     Mitral Valve: There is mitral annular calcification. There is trace  regurgitation.     Tricuspid Valve: There is trace regurgitation.     Pulmonary Artery: The estimated pulmonary artery systolic pressure is 26 mmHg.     IVC/SVC: Normal venous pressure at 3 mmHg.     CT Brain Perfusion inc Post Processing  Result Date: 3/14/2025  EXAMINATION: CT BRAIN PERFUSION INC POST PROCESSING CLINICAL HISTORY: Stroke, sudden onset left sided neglect; TECHNIQUE: Multiphase postcontrast axial imaging of the brain was obtained for CT perfusion with post processing COMPARISON: CTA head neck from the same day FINDINGS: There is increased T-max in the right posterior MCA territory, with decreased cerebral blood flow and a mismatch volume of 14 mL.   Impression:  Small perfusion mismatch in the right posterior MCA territory   Electronically signed by: Iraida Clnacy Date:    03/14/2025 Time:    12:50    CTA Head and Neck (xpd)  Result Date: 3/14/2025  EXAMINATION: CTA HEAD AND NECK (XPD) CLINICAL HISTORY: Stroke/TIA, determine embolic source; TECHNIQUE: Axial images obtained through the cervical region and [place] of [place] before and after the administration of intravenous contrast. Coronal, sagittal, MIP and 3D reconstructions were obtained from the axial data. Automatic exposure control was utilized to limit radiation dose. Radiation Dose: Total DLP: 1191 mGy*cm COMPARISON: None available FINDINGS: Head CT with contrast: There is a cortical based hypodensity in the right insula suggestive of subacute ischemic changes. No enhancing abnormalities. If present, stenosis of the carotid bulbs is measured based on NASCET criteria, i.e. area of maximal stenosis compared to the cervical ICA distal to the bulb. Cervical CTA: The origins of the great vessels are patent mild scattered calcifications. The common carotid, carotid bulbs and internal carotid are patent. The vertebral arteries are patent. Intracranial CTA: The internal carotid arteries are patent.  There is a nonocclusive thrombus in a right MCA  M2 branch.  Otherwise the middle cerebral arteries and anterior cerebral is are patent. The vertebral arteries, basilar artery and posterior cerebral arteries are patent. The dural venous sinuses are patent.   Impression:  1. Likely subacute ischemic changes in the right insula.   2. Nonocclusive thrombus in a right MCA M2 branch.   Electronically signed by: Iraida Clancy Date:    03/14/2025 Time:    12:48